# Patient Record
Sex: MALE | Race: WHITE | NOT HISPANIC OR LATINO | Employment: OTHER | URBAN - METROPOLITAN AREA
[De-identification: names, ages, dates, MRNs, and addresses within clinical notes are randomized per-mention and may not be internally consistent; named-entity substitution may affect disease eponyms.]

---

## 2017-10-13 ENCOUNTER — OFFICE VISIT (OUTPATIENT)
Dept: FAMILY MEDICINE | Facility: CLINIC | Age: 52
End: 2017-10-13
Payer: COMMERCIAL

## 2017-10-13 VITALS
HEART RATE: 74 BPM | HEIGHT: 66 IN | RESPIRATION RATE: 16 BRPM | BODY MASS INDEX: 30.22 KG/M2 | TEMPERATURE: 97.8 F | OXYGEN SATURATION: 97 % | DIASTOLIC BLOOD PRESSURE: 72 MMHG | WEIGHT: 188 LBS | SYSTOLIC BLOOD PRESSURE: 128 MMHG

## 2017-10-13 DIAGNOSIS — J20.9 ACUTE BRONCHITIS WITH SYMPTOMS > 10 DAYS: Primary | ICD-10-CM

## 2017-10-13 PROCEDURE — 99213 OFFICE O/P EST LOW 20 MIN: CPT | Performed by: FAMILY MEDICINE

## 2017-10-13 RX ORDER — ALBUTEROL SULFATE 90 UG/1
2 AEROSOL, METERED RESPIRATORY (INHALATION) EVERY 6 HOURS PRN
Qty: 1 INHALER | Refills: 0 | Status: SHIPPED | OUTPATIENT
Start: 2017-10-13 | End: 2018-05-11

## 2017-10-13 RX ORDER — AZITHROMYCIN 250 MG/1
TABLET, FILM COATED ORAL
Qty: 6 TABLET | Refills: 0 | Status: SHIPPED | OUTPATIENT
Start: 2017-10-13 | End: 2018-05-11

## 2017-10-13 NOTE — MR AVS SNAPSHOT
"              After Visit Summary   10/13/2017    Kevin Dover    MRN: [id number]           Patient Information     Date Of Birth          1965        Visit Information        Provider Department      10/13/2017 4:00 PM Bryn Levin MD [facility]3] [place]        Today's Diagnoses     Acute bronchitis with symptoms > 10 days    -  1       Follow-ups after your visit        Who to contact     If you have questions or need follow up information about today's clinic visit or your schedule please contact [facility] [place] directly at [phone].  Normal or non-critical lab and imaging results will be communicated to you by Xetalhart, letter or phone within 4 business days after the clinic has received the results. If you do not hear from us within 7 days, please contact the clinic through Xetalhart or phone. If you have a critical or abnormal lab result, we will notify you by phone as soon as possible.  Submit refill requests through Sr.Pago or call your pharmacy and they will forward the refill request to us. Please allow 3 business days for your refill to be completed.          Additional Information About Your Visit        MyChart Information     Sr.Pago lets you send messages to your doctor, view your test results, renew your prescriptions, schedule appointments and more. To sign up, go to [email or web address] . Click on \"Log in\" on the left side of the screen, which will take you to the Welcome page. Then click on \"Sign up Now\" on the right side of the page.     You will be asked to enter the access code listed below, as well as some personal information. Please follow the directions to create your username and password.     Your access code is: [email or web address]  Expires: 2018  4:34 PM     Your access code will  in 90 days. If you need help or a new code, please call your [facility] or [phone].        Care EveryWhere ID     This is your Care EveryWhere ID. This could " "be used by other organizations to access your [place] medical records  [id number]        Your Vitals Were     Pulse Temperature Respirations Height Pulse Oximetry BMI (Body Mass Index)    74 97.8  F (36.6  C) 16 5' 6\" (1.676 m) 97% 30.34 kg/m2       Blood Pressure from Last 3 Encounters:   10/13/17 128/72   11/08/16 108/66   03/09/16 136/80    Weight from Last 3 Encounters:   10/13/17 188 lb (85.3 kg)   11/08/16 197 lb (89.4 kg)   03/09/16 193 lb (87.5 kg)              Today, you had the following     No orders found for display         Today's Medication Changes          These changes are accurate as of: 10/13/17  4:34 PM.  If you have any questions, ask your nurse or doctor.               Start taking these medicines.        Dose/Directions    albuterol 108 (90 BASE) MCG/ACT Inhaler   Commonly known as:  PROAIR HFA/PROVENTIL HFA/VENTOLIN HFA   Used for:  Acute bronchitis with symptoms > 10 days   Started by:  Bryn Levin MD        Dose:  2 puff   Inhale 2 puffs into the lungs every 6 hours as needed for shortness of breath / dyspnea or wheezing   Quantity:  1 Inhaler   Refills:  0       azithromycin 250 MG tablet   Commonly known as:  ZITHROMAX   Used for:  Acute bronchitis with symptoms > 10 days   Started by:  Bryn Levin MD        Two tablets first day, then one tablet daily for four days.   Quantity:  6 tablet   Refills:  0            Where to get your medicines      [id number] [place] - [address] AT [facility]  [address] [address]     Phone:  [phone]     albuterol 108 (90 BASE) MCG/ACT Inhaler    azithromycin 250 MG tablet                Primary Care Provider Office Phone # Fax #    Bryn Levin [phone]       [address]  [place] [address]        Equal Access to Services     [facility]: Shaun Lamb, waaxda lustuart jean-baptiste waxay idiin " ailyn handjj lafaviojose ah. So [facility] [phone].    ATENCIÓN: Si fredrickla tootie, tiene a jones disposición servicios gratuitos de asistencia lingüística. Lani chatman [phone].    We comply with applicable federal civil rights laws and [place] laws. We do not discriminate on the basis of race, color, national origin, age, disability, sex, sexual orientation, or gender identity.            Thank you!     Thank you for choosing [facility] [place]  for your care. Our goal is always to provide you with excellent care. Hearing back from our patients is one way we can continue to improve our services. Please take a few minutes to complete the written survey that you may receive in the mail after your visit with us. Thank you!             Your Updated Medication List - Protect others around you: Learn how to safely use, store and throw away your medicines at www.disposemymeds.org.          This list is accurate as of: 10/13/17  4:34 PM.  Always use your most recent med list.                   Brand Name Dispense Instructions for use Diagnosis    albuterol 108 (90 BASE) MCG/ACT Inhaler    PROAIR HFA/PROVENTIL HFA/VENTOLIN HFA    1 Inhaler    Inhale 2 puffs into the lungs every 6 hours as needed for shortness of breath / dyspnea or wheezing    Acute bronchitis with symptoms > 10 days       azithromycin 250 MG tablet    ZITHROMAX    6 tablet    Two tablets first day, then one tablet daily for four days.    Acute bronchitis with symptoms > 10 days       ibuprofen 800 MG tablet    ADVIL/MOTRIN    30 tablet    TAKE 1 TABLET(800 MG) BY MOUTH EVERY 8 HOURS AS NEEDED FOR MODERATE PAIN    Episodic tension-type headache, not intractable

## 2017-10-13 NOTE — PROGRESS NOTES
"Chief Complaint   Patient presents with     URI       Initial /72  Pulse 74  Temp 97.8  F (36.6  C)  Resp 16  Ht 5' 6\" (1.676 m)  Wt 188 lb (85.3 kg)  SpO2 97%  BMI 30.34 kg/m2 Estimated body mass index is 30.34 kg/(m^2) as calculated from the following:    Height as of this encounter: 5' 6\" (1.676 m).    Weight as of this encounter: 188 lb (85.3 kg).  Medication Reconciliation: complete. KASSI Resendiz LPN        SUBJECTIVE:   Kevin Dover is a 52 year old male who presents to clinic today for the following health issues:      Acute Illness   Acute illness concerns: URI   Onset: 1 week    Fever: YES- evenings    Chills/Sweats: YES- both    Headache (location?): YES    Sinus Pressure:YES    Conjunctivitis:  YES- watery    Ear Pain: no    Rhinorrhea: YES- blowing a lot out - thick/yellow    Congestion: YES    Sore Throat: YES     Cough: YES-productive of yellow sputum    Wheeze: no    Decreased Appetite: no    Nausea: no    Vomiting: no    Diarrhea:  no    Dysuria/Freq.: no    Fatigue/Achiness: YES- both    Sick/Strep Exposure: YES- wife      Therapies Tried and outcome: nothing           Problem list and histories reviewed & adjusted, as indicated.  Additional history: as documented        Reviewed and updated as needed this visit by clinical staffTobacco  Allergies  Meds  Surg Hx  Fam Hx  Soc Hx      Reviewed and updated as needed this visit by Provider         ROS:  C: NEGATIVE for fever, chills, change in weight  ENT/MOUTH: POSITIVE for sinus congestion  R: Nonproductive cough  CV: NEGATIVE for chest pain, palpitations or peripheral edema    OBJECTIVE:                                                    /72  Pulse 74  Temp 97.8  F (36.6  C)  Resp 16  Ht 5' 6\" (1.676 m)  Wt 188 lb (85.3 kg)  SpO2 97%  BMI 30.34 kg/m2  Body mass index is 30.34 kg/(m^2).   GENERAL: healthy, alert, well nourished, well hydrated, no distress  HENT: ear canals- normal; TMs- normal; Nose- normal; Mouth- no " ulcers, no lesions  NECK: no tenderness, no adenopathy, no asymmetry, no masses, no stiffness; thyroid- normal to palpation  RESP: lungs clear to auscultation -mild wheezing noted, no crackles  CV: regular rates and rhythm, normal S1 S2, no S3 or S4 and no murmur, no click or rub -       ASSESSMENT/PLAN:                                                        ICD-10-CM    1. Acute bronchitis with symptoms > 10 days J20.9 azithromycin (ZITHROMAX) 250 MG tablet     albuterol (PROAIR HFA/PROVENTIL HFA/VENTOLIN HFA) 108 (90 BASE) MCG/ACT Inhaler       Patient's symptoms are most likely upper respiratory bronchitis. I will suggest patient to use azithromycin along with albuterol inhaler. Follow up if symptoms are not changing or getting worse.    Bryn Levin MD  [facility]

## 2018-05-02 NOTE — TELEPHONE ENCOUNTER
FUTURE VISIT INFORMATION      FUTURE VISIT INFORMATION:    Date: 5-11-18    Time:     Location:   REFERRAL INFORMATION:    Referring provider:  self    Referring providers clinic:      Reason for visit/diagnosis  tinnitus    RECORDS REQUESTED FROM:       Clinic name Comments Records Status Imaging Status                                         RECORDS STATUS

## 2018-05-04 ENCOUNTER — TELEPHONE (OUTPATIENT)
Dept: OTOLARYNGOLOGY | Facility: CLINIC | Age: 53
End: 2018-05-04

## 2018-05-04 NOTE — TELEPHONE ENCOUNTER
Left patient voicemail regarding where patient has records for his appointment with Dr Jorge. Gave patient my direct phone number to contact.      Emelyn  [facility] Coordinator

## 2018-05-10 DIAGNOSIS — H93.19 TINNITUS: Primary | ICD-10-CM

## 2018-05-11 ENCOUNTER — OFFICE VISIT (OUTPATIENT)
Dept: AUDIOLOGY | Facility: CLINIC | Age: 53
End: 2018-05-11
Payer: COMMERCIAL

## 2018-05-11 ENCOUNTER — PRE VISIT (OUTPATIENT)
Dept: OTOLARYNGOLOGY | Facility: CLINIC | Age: 53
End: 2018-05-11

## 2018-05-11 ENCOUNTER — OFFICE VISIT (OUTPATIENT)
Dept: OTOLARYNGOLOGY | Facility: CLINIC | Age: 53
End: 2018-05-11
Payer: COMMERCIAL

## 2018-05-11 VITALS — BODY MASS INDEX: 29.25 KG/M2 | HEIGHT: 68 IN | WEIGHT: 193 LBS

## 2018-05-11 DIAGNOSIS — H93.19 TINNITUS: ICD-10-CM

## 2018-05-11 DIAGNOSIS — D16.4 OSTEOMA OF EXTERNAL AUDITORY CANAL: ICD-10-CM

## 2018-05-11 DIAGNOSIS — H93.13 TINNITUS, BILATERAL: Primary | ICD-10-CM

## 2018-05-11 DIAGNOSIS — H93.13 TINNITUS OF BOTH EARS: ICD-10-CM

## 2018-05-11 DIAGNOSIS — H90.3 SENSORINEURAL HEARING LOSS, BILATERAL: Primary | ICD-10-CM

## 2018-05-11 PROBLEM — M71.21 BAKER'S CYST OF KNEE, RIGHT: Status: ACTIVE | Noted: 2018-01-14

## 2018-05-11 PROBLEM — M17.11 ARTHRITIS OF RIGHT KNEE: Status: ACTIVE | Noted: 2018-01-14

## 2018-05-11 ASSESSMENT — PAIN SCALES - GENERAL: PAINLEVEL: NO PAIN (0)

## 2018-05-11 NOTE — MR AVS SNAPSHOT
After Visit Summary   2018    Kevin Dover    MRN: [id number]           Patient Information     Date Of Birth          1965        Visit Information        Provider Department      2018 8:30 AM Chidi Jorge MD M [facility] Ear Nose and Throat        Care Instructions    You will have a consult with Dr. Joyce Ortiz to discuss surgery for right ear osteoma.   Please call our clinic for any questions,concerns,or worsening symptoms.      Clinic #[phone]       Option 3  for the triage nurse.          Follow-ups after your visit        Your next 10 appointments already scheduled     2018  3:00 PM CDT   (Arrive by 2:45 PM)   NEW NEUROTOLOGY VISIT with MD ALTAGRACIA Chavez [facility] Ear Nose and Throat ([facility])    [address] [place] [address] [phone]              Who to contact     Please call your clinic at [phone] to:    Ask questions about your health    Make or cancel appointments    Discuss your medicines    Learn about your test results    Speak to your doctor            Additional Information About Your Visit        MyChart Information     Skyrider is an electronic gateway that provides easy, online access to your medical records. With Skyrider, you can request a clinic appointment, read your test results, renew a prescription or communicate with your care team.     To sign up for Skyrider visit the website at [email or web address]   You will be asked to enter the access code listed below, as well as some personal information. Please follow the directions to create your username and password.     Your access code is: [email or web address]  Expires: 2018  6:30 AM     Your access code will  in 90 days. If you need help or a new code, please contact your [facility] or call [phone] for assistance.        Care EveryWhere ID     This is your Care EveryWhere  "ID. This could be used by other organizations to access your [place] medical records  [id number]        Your Vitals Were     Height BMI (Body Mass Index)                1.72 m (5' 7.72\") 29.59 kg/m2           Blood Pressure from Last 3 Encounters:   10/13/17 128/72   11/08/16 108/66   03/09/16 136/80    Weight from Last 3 Encounters:   05/11/18 87.5 kg (193 lb)   10/13/17 85.3 kg (188 lb)   11/08/16 89.4 kg (197 lb)              Today, you had the following     No orders found for display       Primary Care Provider Office Phone # Fax #    Bryn Levin [phone]       [address]  [place] [address]        Equal Access to Services     [facility]: Hadii hemal hungo [place], waaxda luqadaha, qaybta kaalmada mara, loren jacobo . So [facility] [phone].    ATENCIÓN: Si habla español, tiene a jones disposición servicios gratuitos de asistencia lingüística. Lani al [phone].    We comply with applicable federal civil rights laws and [place] laws. We do not discriminate on the basis of race, color, national origin, age, disability, sex, sexual orientation, or gender identity.            Thank you!     Thank you for choosing [facility] EAR NOSE AND THROAT  for your care. Our goal is always to provide you with excellent care. Hearing back from our patients is one way we can continue to improve our services. Please take a few minutes to complete the written survey that you may receive in the mail after your visit with us. Thank you!             Your Updated Medication List - Protect others around you: Learn how to safely use, store and throw away your medicines at www.disposemymeds.org.          This list is accurate as of 5/11/18  8:40 AM.  Always use your most recent med list.                   Brand Name Dispense Instructions for use Diagnosis    ACETAMINOPHEN PO           ibuprofen 800 MG tablet    ADVIL/MOTRIN    30 tablet    TAKE 1 TABLET(800 MG) " BY MOUTH EVERY 8 HOURS AS NEEDED FOR MODERATE PAIN    Episodic tension-type headache, not intractable

## 2018-05-11 NOTE — LETTER
5/11/2018       RE: Kevin Dover  [address] [place] [address]     Dear Colleague,    Thank you for referring your patient, Kevin Dover, to the [facility] EAR NOSE AND TH[facility]AT at [facility]. Please see a copy of my visit note below.    Kevin Dover is seen because I cared for his wife..  He is a 52 year old male being seen for tinnitus. The patient notes a gradual onset of progressive tinnitus which is slightly louder and his right ear than his left ear. He reports that this has been bothersome and irritating. He denies any drainage or pain. He has no other history.    Past Medical History:   Diagnosis Date     Conductive hearing loss      Hearing problem      NO ACTIVE PROBLEMS      Tinnitus        Past Surgical History:   Procedure Laterality Date     C NONSPECIFIC PROCEDURE      varicocoel     HERNIORRHAPHY VENTRAL N/A 3/2/2015    Procedure: HERNIORRHAPHY VENTRAL;  Surgeon: Flavio Nowak MD;  Location: [facility] OR     RHINOPLASTY         Family History   Problem Relation Age of Onset     CANCER Father      lung CA     Cancer - colorectal Father      C.A.D. 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 He denies any exposure to weapons.    Patient Supplied Answers to Review of Systems   ENT ROS 5/11/2018   Ears, Nose, Throat Hearing loss, Ringing/noise in ears     The remainder of the 10 point review of systems is otherwise negative.    Physical examination:  Constitutional:  In no acute distress, appears stated age  Eyes:  Extraocular movements intact, no spontaneous  nystagmus  Ears:  Both ears examined under the microscope.  Today his left ear looked normal. The right ear had a cerumen impaction. In removing all of the cerumen posterior and superior there is a relatively small osteoma. It is at the center of the cerumen impaction.  Respiratory:  No increased work of breathing, wheezing or stridor  Musculoskeletal:  Good upper extremity strength  Skin:  No rashes on the head and neck  Neurologic:  House Brackman 1/6 bilaterally, ambulating normally  Psychiatric:  Alert, normal affect, answering questions appropriately    Audiogram:  His pure-tone average is normal at 25 bilaterally and there is a slight asymmetry between the 2 ears. The right ear is slightly poorer    Assessment and plan:  Tinnitus etiology unclear. Osteoma, right    After I removed all of the cerumen the patient said his tinnitus was substantially improved. We talked about the indications to remove an osteoma. Usually, one would see frequent cerumen impactions or otorrhea before removing an osteoma. He feels that the removal of the wax gave him substantial improvement in his symptoms and he is interested in removal of the osteoma. I told him that this is a relatively low risk procedure but is unlikely to cure his symptoms of tinnitus. He understands that this may reduce the frequency of cerumen impactions and thus reduce the frequency of the impaction related problem of tinnitus; Since the risk is low, he is interested in proceeding. I will refer him to one of my colleagues.      Again, thank you for allowing me to participate in the care of your patient.      Sincerely,    Chidi Jorge MD

## 2018-05-11 NOTE — PATIENT INSTRUCTIONS
You will have a consult with Dr. Joyce Ortiz to discuss surgery for right ear osteoma.   Please call our clinic for any questions,concerns,or worsening symptoms.      Clinic #[phone]       Option 3  for the triage nurse.

## 2018-05-11 NOTE — PROGRESS NOTES
AUDIOLOGY REPORT    SUMMARY: Audiology visit completed. See audiogram for results.      RECOMMENDATIONS: Follow-up with ENT.    Onel Jerez, [facility]  Licensed Audiologist  [place] License #[id number]

## 2018-05-11 NOTE — MR AVS SNAPSHOT
After Visit Summary   2018    Kevin Dover    MRN: [id number]           Patient Information     Date Of Birth          1965        Visit Information        Provider Department      2018 7:30 AM Mara Luque, [facility] Audiology        Today's Diagnoses     Sensorineural hearing loss, bilateral    -  1    Tinnitus        Tinnitus of both ears           Follow-ups after your visit        Your next 10 appointments already scheduled     May 11, 2018  8:30 AM CDT   (Arrive by 8:15 AM)   New Patient Visit with MD ALTAGRACIA Mccarthy [facility] Ear Nose and Throat ([facility] [place])    [address] [place] [address] [phone]              Who to contact     Please call your clinic at [phone] to:    Ask questions about your health    Make or cancel appointments    Discuss your medicines    Learn about your test results    Speak to your doctor            Additional Information About Your Visit        MyChart Information     Handupt is an electronic gateway that provides easy, online access to your medical records. With GlucoVista, you can request a clinic appointment, read your test results, renew a prescription or communicate with your care team.     To sign up for Handupt visit the website at [email or web address]   You will be asked to enter the access code listed below, as well as some personal information. Please follow the directions to create your username and password.     Your access code is: [email or web address]  Expires: 2018  6:30 AM     Your access code will  in 90 days. If you need help or a new code, please contact your [facility] or call [phone] for assistance.        Care EveryWhere ID     This is your Care EveryWhere ID. This could be used by other organizations to access your [place] medical records  [id number]         Blood Pressure from Last 3 Encounters:   10/13/17  128/72   11/08/16 108/66   03/09/16 136/80    Weight from Last 3 Encounters:   10/13/17 85.3 kg (188 lb)   11/08/16 89.4 kg (197 lb)   03/09/16 87.5 kg (193 lb)              We Performed the Following     AUDIOLOGY ADULT REFERRAL     [facility] Eval & Speech Recog ([id number])     Tymps / Reflex   ([id number])        Primary Care Provider Office Phone # Fax #    Bryn Levin [phone]       [address] [place] [facility] [place] [address]        Equal Access to Services     [facility]: Hadii aad ku hadasho Sodamonali, waaxda luqadaha, qaybta kaalmada adeegyada, waxgianni milliganin иванn karen jcaobo . So [facility] [phone].    ATENCIÓN: Si habla español, tiene a jones disposición servicios gratuitos de asistencia lingüística. LlSumma Health [facility] [phone].    We comply with applicable federal civil rights laws and [place] laws. We do not discriminate on the basis of race, color, national origin, age, disability, sex, sexual orientation, or gender identity.            Thank you!     Thank you for choosing [facility] AUDIOLOGY  for your care. Our goal is always to provide you with excellent care. Hearing back from our patients is one way we can continue to improve our services. Please take a few minutes to complete the written survey that you may receive in the mail after your visit with us. Thank you!             Your Updated Medication List - Protect others around you: Learn how to safely use, store and throw away your medicines at www.disposemymeds.org.          This list is accurate as of 5/11/18  7:45 AM.  Always use your most recent med list.                   Brand Name Dispense Instructions for use Diagnosis    albuterol 108 (90 Base) MCG/ACT Inhaler    PROAIR HFA/PROVENTIL HFA/VENTOLIN HFA    1 Inhaler    Inhale 2 puffs into the lungs every 6 hours as needed for shortness of breath / dyspnea or wheezing    Acute bronchitis with symptoms > 10 days       azithromycin 250 MG tablet    ZITHROMAX    6  tablet    Two tablets first day, then one tablet daily for four days.    Acute bronchitis with symptoms > 10 days       ibuprofen 800 MG tablet    ADVIL/MOTRIN    30 tablet    TAKE 1 TABLET(800 MG) BY MOUTH EVERY 8 HOURS AS NEEDED FOR MODERATE PAIN    Episodic tension-type headache, not intractable

## 2018-05-11 NOTE — PROGRESS NOTES
Kevin Dover is seen because I cared for his wife..  He is a 52 year old male being seen for tinnitus. The patient notes a gradual onset of progressive tinnitus which is slightly louder and his right ear than his left ear. He reports that this has been bothersome and irritating. He denies any drainage or pain. He has no other history.    Past Medical History:   Diagnosis Date     Conductive hearing loss      Hearing problem      NO ACTIVE PROBLEMS      Tinnitus        Past Surgical History:   Procedure Laterality Date     C NONSPECIFIC PROCEDURE      varicocoel     HERNIORRHAPHY VENTRAL N/A 3/2/2015    Procedure: HERNIORRHAPHY VENTRAL;  Surgeon: Flavio Nowak MD;  Location: [facility] OR     RHINOPLASTY         Family History   Problem Relation Age of Onset     CANCER Father      lung CA     Cancer - colorectal Father      C.A.D. Mother      CEREBROVASCULAR DISEASE Mother      Breast Cancer Sister      DIABETES No family hx of      Hypertension No family hx of        Social History   Substance Use Topics     Smoking status: Former Smoker     Packs/day: 1.00     Years: 20.00     Types: Cigarettes     Quit date: 9/1/2004     Smokeless tobacco: Never Used     Alcohol use No   \  He works in the [employer] industry and has been exposed to size most of his life. He denies any exposure to weapons.    Patient Supplied Answers to Review of Systems   ENT ROS 5/11/2018   Ears, Nose, Throat Hearing loss, Ringing/noise in ears     The remainder of the 10 point review of systems is otherwise negative.    Physical examination:  Constitutional:  In no acute distress, appears stated age  Eyes:  Extraocular movements intact, no spontaneous nystagmus  Ears:  Both ears examined under the microscope.  Today his left ear looked normal. The right ear had a cerumen impaction. In removing all of the cerumen posterior and superior there is a relatively small osteoma. It is at the center of the cerumen impaction.  Respiratory:  No increased  work of breathing, wheezing or stridor  Musculoskeletal:  Good upper extremity strength  Skin:  No rashes on the head and neck  Neurologic:  House Brackman 1/6 bilaterally, ambulating normally  Psychiatric:  Alert, normal affect, answering questions appropriately    Audiogram:  His pure-tone average is normal at 25 bilaterally and there is a slight asymmetry between the 2 ears. The right ear is slightly poorer    Assessment and plan:  Tinnitus etiology unclear. Osteoma, right    After I removed all of the cerumen the patient said his tinnitus was substantially improved. We talked about the indications to remove an osteoma. Usually, one would see frequent cerumen impactions or otorrhea before removing an osteoma. He feels that the removal of the wax gave him substantial improvement in his symptoms and he is interested in removal of the osteoma. I told him that this is a relatively low risk procedure but is unlikely to cure his symptoms of tinnitus. He understands that this may reduce the frequency of cerumen impactions and thus reduce the frequency of the impaction related problem of tinnitus; Since the risk is low, he is interested in proceeding. I will refer him to one of my colleagues.

## 2018-05-14 PROBLEM — D16.4 OSTEOMA OF EXTERNAL AUDITORY CANAL: Status: ACTIVE | Noted: 2018-05-14

## 2018-05-14 PROBLEM — H93.13 TINNITUS, BILATERAL: Status: ACTIVE | Noted: 2018-05-14

## 2018-05-30 NOTE — TELEPHONE ENCOUNTER
FUTURE VISIT INFORMATION      FUTURE VISIT INFORMATION:    Date: 6-5-18    Time:     Location:   REFERRAL INFORMATION:    Referring provider:  Dr Jorge    Referring providers clinic:      Reason for visit/diagnosis  surgery consult, ear ostemoa    RECORDS REQUESTED FROM:       Clinic name Comments Records Status Imaging Status   All records internal                                       RECORDS STATUS

## 2018-06-05 ENCOUNTER — OFFICE VISIT (OUTPATIENT)
Dept: OTOLARYNGOLOGY | Facility: CLINIC | Age: 53
End: 2018-06-05
Payer: COMMERCIAL

## 2018-06-05 ENCOUNTER — PRE VISIT (OUTPATIENT)
Dept: OTOLARYNGOLOGY | Facility: CLINIC | Age: 53
End: 2018-06-05

## 2018-06-05 VITALS
DIASTOLIC BLOOD PRESSURE: 75 MMHG | BODY MASS INDEX: 30.13 KG/M2 | HEIGHT: 67 IN | WEIGHT: 192 LBS | SYSTOLIC BLOOD PRESSURE: 136 MMHG

## 2018-06-05 DIAGNOSIS — D16.4 OSTEOMA OF EXTERNAL AUDITORY CANAL: Primary | ICD-10-CM

## 2018-06-05 DIAGNOSIS — H93.13 TINNITUS, BILATERAL: ICD-10-CM

## 2018-06-05 ASSESSMENT — PAIN SCALES - GENERAL: PAINLEVEL: NO PAIN (0)

## 2018-06-05 NOTE — MR AVS SNAPSHOT
"              After Visit Summary   2018    Kevin Dover    MRN: [id number]           Patient Information     Date Of Birth          1965        Visit Information        Provider Department      2018 3:30 PM Joyce Ortiz MD [facility] Ear Nose and Throat        Today's Diagnoses     Osteoma of external auditory canal    -  1    Tinnitus, bilateral          Care Instructions    1. Please follow-up in clinic in 1 year with an audiogram.   2. Please call the ENT clinic with any questions,concerns, new or worsening symptoms.    -Clinic number is [phone]   - [facility] direct line (Dr. Ortiz' nurse) [phone]              Follow-ups after your visit        Who to contact     Please call your clinic at [phone] to:    Ask questions about your health    Make or cancel appointments    Discuss your medicines    Learn about your test results    Speak to your doctor            Additional Information About Your Visit        MyChart Information     [employer] is an electronic gateway that provides easy, online access to your medical records. With [employer], you can request a clinic appointment, read your test results, renew a prescription or communicate with your care team.     To sign up for [employer] visit the website at [email or web address]   You will be asked to enter the access code listed below, as well as some personal information. Please follow the directions to create your username and password.     Your access code is: [email or web address]  Expires: 2018  6:30 AM     Your access code will  in 90 days. If you need help or a new code, please contact your [facility] or call [phone] for assistance.        Care EveryWhere ID     This is your Care EveryWhere ID. This could be used by other organizations to access your [facility] medical records  [id number]        Your Vitals Were     Height BMI (Body Mass Index)                1.702 m (5' 7\") 30.07 " kg/m2           Blood Pressure from Last 3 Encounters:   06/05/18 136/75   10/13/17 128/72   11/08/16 108/66    Weight from Last 3 Encounters:   06/05/18 87.1 kg (192 lb)   05/11/18 87.5 kg (193 lb)   10/13/17 85.3 kg (188 lb)              Today, you had the following     No orders found for display       Primary Care Provider Office Phone # Fax #    Bryn Levin [phone]       [address]  [place] [address]        Equal Access to Services     [facility]: Hadii hemal peñaloza hadkathrin Soambika, wasvetlanada yesi, qaybta kaalmada mara, loren jacobo . So [facility] [phone].    ATENCIÓN: Si habla español, tiene a jones disposición servicios gratuitos de asistencia lingüística. Llame al [phone].    We comply with applicable federal civil rights laws and [place] laws. We do not discriminate on the basis of race, color, national origin, age, disability, sex, sexual orientation, or gender identity.            Thank you!     Thank you for choosing [facility] TH[facility]  for your care. Our goal is always to provide you with excellent care. Hearing back from our patients is one way we can continue to improve our services. Please take a few minutes to complete the written survey that you may receive in the mail after your visit with us. Thank you!             Your Updated Medication List - Protect others around you: Learn how to safely use, store and throw away your medicines at www.disposemymeds.org.          This list is accurate as of 6/5/18 11:59 PM.  Always use your most recent med list.                   Brand Name Dispense Instructions for use Diagnosis    ACETAMINOPHEN PO           ibuprofen 800 MG tablet    ADVIL/MOTRIN    30 tablet    TAKE 1 TABLET(800 MG) BY MOUTH EVERY 8 HOURS AS NEEDED FOR MODERATE PAIN    Episodic tension-type headache, not intractable

## 2018-06-05 NOTE — PROGRESS NOTES
History of the present illness:  Mr. Dover is a 52 year old man with a history of bilateral tonal tinnitus onset three years ago presenting for evaluation of a right sided canal osteoma. The patient also notes bilateral hearing loss over this interval but is unsure if one ear is worse than the other. The patient was evaluated by Dr. Jorge on 5/11/18 who noted right side cerumen impaction. When removing the cerumen he described an osteoma in the right superior posterior segment. The patient states that his tinnitus was briefly improved after the cerumen removal but returned later that day. 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 He denies otalgia and otorrhea bilaterally.     Past Medical History:   Diagnosis Date     Conductive hearing loss      Hearing problem      NO ACTIVE PROBLEMS      Tinnitus      Past Surgical History:   Procedure Laterality Date     C NONSPECIFIC PROCEDURE      varicocoel     HERNIORRHAPHY VENTRAL N/A 3/2/2015    Procedure: HERNIORRHAPHY VENTRAL;  Surgeon: Flavio Nowak MD;  Location: [facility] OR     RHINOPLASTY       Current Outpatient Prescriptions   Medication     ACETAMINOPHEN PO     ibuprofen (ADVIL/MOTRIN) 800 MG tablet     No current facility-administered medications for this visit.      Social History     Social History     Marital status:      Spouse name: N/A     Number of children: N/A     Years of education: N/A     Occupational History     Not on file.     Social History Main Topics     Smoking status: Former Smoker     Packs/day: 1.00     Years: 20.00     Types: Cigarettes     Quit date: 9/1/2004     Smokeless tobacco: Never Used     Alcohol use No     Drug use: No     Sexual activity: Yes     Partners: Female     Other Topics Concern     Parent/Sibling W/ Cabg, Mi Or Angioplasty Before 65f 55m? No     Social History Narrative     Allergies: No Known Allergies    Patient  "Supplied Answers to Review of Systems   ENT ROS 6/5/2018   Ears, Nose, Throat Ringing/noise in ears   A full 10 point review of systems was conducted and all other systems negative unless mentioned above or in HPI.     Physical Exam:  /75  Ht 1.702 m (5' 7\")  Wt 87.1 kg (192 lb)  BMI 30.07 kg/m2  Constitutional: awake, alert, no acute distress  HEENT:   Right ear microscopic exam: smooth round mass at the bony-cartilaginous junction superior posterior segment proximal to the TM (about 3 mm) with mild surrounding cerumen that was easily removed, TM pearly white, no canal erythema, no significant cerumen impaction  Left ear microscopic exam: TM pearly white, no canal erythema, no significant cerumen impaction  Neuro: PERRL, extraocular movements intact  Resp: speaking in full sentences, no accessory muscle use  Skin: warm, pink  Psych: calm, answered questions appropriately.    Audiology exam 5/11/18: 100% word recognition at 65 dBHL bilaterally. Normal to mild SNHL bilaterally with air bone gaps present    Assessment and Plan  Mr. Dover is a 52 year old man with a history of bilateral tonal tinnitus onset three years ago presenting for evaluation of a right sided canal osteoma. This was visualized on exam but was not in contact with the TM, thus not likely contributing to his tinnitus. He stated that he only wanted a procedure to remove the osteoma if it will remove his tinnitus. It will not.  Therefore, we discussed different approaches to tinnitus management, including use of hearing aids to improve tinnitus and provided referral information. Also discussed use of masking noise to help the patient sleep at night. Plan for repeat audiogram in one year and subsequent follow up appointment.    Leila Hsu, MS4    The documentation recorded by the scribe accurately reflects the services I personally performed and the decisions made by me.    "

## 2018-06-05 NOTE — PATIENT INSTRUCTIONS
1. Please follow-up in clinic in 1 year with an audiogram.   2. Please call the ENT clinic with any questions,concerns, new or worsening symptoms.    -Clinic number is [phone]   - [facility] direct line (Dr. Ortiz' nurse) [phone]

## 2018-06-05 NOTE — LETTER
Hearing Aid Medical Clearance    Kevin Dover  June 5, 2018        This patient has received a medical examination and may be considered a suitable candidate for a hearing aid.         Physician:__________________________________________________

## 2018-06-05 NOTE — LETTER
6/5/2018       RE: Kevin Dover  [address] [place] [address]     Dear Colleague,    Thank you for referring your patient, Kevin Dover, to the [facility] at [facility]. Please see a copy of my visit note below.      History of the present illness:  Mr. Dover is a 52 year old man with a history of bilateral tonal tinnitus onset three years ago presenting for evaluation of a right sided canal osteoma. The patient also notes bilateral hearing loss over this interval but is unsure if one ear is worse than the other. The patient was evaluated by Dr. Jorge on 5/11/18 who noted right side cerumen impaction. When removing the cerumen he described an osteoma in the right superior posterior segment. The patient states that his tinnitus was briefly improved after the cerumen removal but returned later that day. 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 He denies otalgia and otorrhea bilaterally.     Past Medical History:   Diagnosis Date     Conductive hearing loss      Hearing problem      NO ACTIVE PROBLEMS      Tinnitus      Past Surgical History:   Procedure Laterality Date     C NONSPECIFIC PROCEDURE      varicocoel     HERNIORRHAPHY VENTRAL N/A 3/2/2015    Procedure: HERNIORRHAPHY VENTRAL;  Surgeon: Flavio Nowak MD;  Location: [facility] OR     RHINOPLASTY       Current Outpatient Prescriptions   Medication     ACETAMINOPHEN PO     ibuprofen (ADVIL/MOTRIN) 800 MG tablet     No current facility-administered medications for this visit.      Social History     Social History     Marital status:      Spouse name: N/A     Number of children: N/A     Years of education: N/A     Occupational History     Not on file.     Social History Main Topics     Smoking status: Former Smoker     Packs/day: 1.00     Years: 20.00     Types: Cigarettes     Quit date:  "9/1/2004     Smokeless tobacco: Never Used     Alcohol use No     Drug use: No     Sexual activity: Yes     Partners: Female     Other Topics Concern     Parent/Sibling W/ Cabg, Mi Or Angioplasty Before 65f 55m? No     Social History Narrative     Allergies: No Known Allergies    Patient Supplied Answers to Review of Systems  [facility] ENT ROS 6/5/2018   Ears, Nose, Throat Ringing/noise in ears   A full 10 point review of systems was conducted and all other systems negative unless mentioned above or in HPI.     Physical Exam:  /75  Ht 1.702 m (5' 7\")  Wt 87.1 kg (192 lb)  BMI 30.07 kg/m2  Constitutional: awake, alert, no acute distress  HEENT:   Right ear microscopic exam: smooth round mass at the bony-cartilaginous junction superior posterior segment proximal to the TM (about 3 mm) with mild surrounding cerumen that was easily removed, TM pearly white, no canal erythema, no significant cerumen impaction  Left ear microscopic exam: TM pearly white, no canal erythema, no significant cerumen impaction  Neuro: PERRL, extraocular movements intact  Resp: speaking in full sentences, no accessory muscle use  Skin: warm, pink  Psych: calm, answered questions appropriately.    Audiology exam 5/11/18: 100% word recognition at 65 dBHL bilaterally. Normal to mild SNHL bilaterally with air bone gaps present    Assessment and Plan  Mr. Dover is a 52 year old man with a history of bilateral tonal tinnitus onset three years ago presenting for evaluation of a right sided canal osteoma. This was visualized on exam but was not in contact with the TM, thus not likely contributing to his tinnitus. He stated that he only wanted a procedure to remove the osteoma if it will remove his tinnitus. It will not.  Therefore, we discussed different approaches to tinnitus management, including use of hearing aids to improve tinnitus and provided referral information. Also discussed use of masking noise to help the patient sleep at night. Plan " for repeat audiogram in one year and subsequent follow up appointment.    Leila Hsu, MS4    The documentation recorded by the scribe accurately reflects the services I personally performed and the decisions made by me.      Again, thank you for allowing me to participate in the care of your patient.      Sincerely,    Joyce Ortiz MD

## 2018-06-05 NOTE — NURSING NOTE
Chief Complaint   Patient presents with     RECHECK     surgery for right ear osteoma, per Dr. Luisito Cook, EMT

## 2018-09-11 ENCOUNTER — OFFICE VISIT (OUTPATIENT)
Dept: FAMILY MEDICINE | Facility: CLINIC | Age: 53
End: 2018-09-11
Payer: COMMERCIAL

## 2018-09-11 VITALS
TEMPERATURE: 98 F | BODY MASS INDEX: 29.13 KG/M2 | HEART RATE: 92 BPM | SYSTOLIC BLOOD PRESSURE: 124 MMHG | DIASTOLIC BLOOD PRESSURE: 70 MMHG | WEIGHT: 186 LBS

## 2018-09-11 DIAGNOSIS — S83.8X1S MENISCAL INJURY, RIGHT, SEQUELA: ICD-10-CM

## 2018-09-11 DIAGNOSIS — Z01.818 PREOP GENERAL PHYSICAL EXAM: Primary | ICD-10-CM

## 2018-09-11 LAB — HGB BLD-MCNC: 14.1 G/DL (ref 13.3–17.7)

## 2018-09-11 PROCEDURE — 85018 HEMOGLOBIN: CPT | Performed by: FAMILY MEDICINE

## 2018-09-11 PROCEDURE — 99214 OFFICE O/P EST MOD 30 MIN: CPT | Performed by: FAMILY MEDICINE

## 2018-09-11 PROCEDURE — 36415 COLL VENOUS BLD VENIPUNCTURE: CPT | Performed by: FAMILY MEDICINE

## 2018-09-11 NOTE — MR AVS SNAPSHOT
After Visit Summary   9/11/2018    Kevin Dover    MRN: [id number]           Patient Information     Date Of Birth          1965        Visit Information        Provider Department      9/11/2018 10:20 AM Bryn Levin MD [facility] [place]        Today's Diagnoses     Preop general physical exam    -  1    Meniscal injury, right, sequela          Care Instructions      Before Your Surgery      Call your surgeon if there is any change in your health. This includes signs of a cold or flu (such as a sore throat, runny nose, cough, rash or fever).    Do not smoke, drink alcohol or take over the counter medicine (unless your surgeon or primary care doctor tells you to) for the 24 hours before and after surgery.    If you take prescribed drugs: Follow your doctor s orders about which medicines to take and which to stop until after surgery.    Eating and drinking prior to surgery: follow the instructions from your surgeon    Take a shower or bath the night before surgery. Use the soap your surgeon gave you to gently clean your skin. If you do not have soap from your surgeon, use your regular soap. Do not shave or scrub the surgery site.  Wear clean pajamas and have clean sheets on your bed.           Follow-ups after your visit        Follow-up notes from your care team     Return in about 4 weeks (around 10/9/2018) for Physical Exam.      Who to contact     If you have questions or need follow up information about today's clinic visit or your schedule please contact [facility] [place] directly at [phone].  Normal or non-critical lab and imaging results will be communicated to you by MyChart, letter or phone within 4 business days after the clinic has received the results. If you do not hear from us within 7 days, please contact the clinic through MyChart or phone. If you have a critical or abnormal lab result, we will notify you by phone as soon as possible.  Submit refill  "requests through [employer] or call your pharmacy and they will forward the refill request to us. Please allow 3 business days for your refill to be completed.          Additional Information About Your Visit        [employer] Information     [employer] lets you send messages to your doctor, view your test results, renew your prescriptions, schedule appointments and more. To sign up, go to [email or web address] . Click on \"Log in\" on the left side of the screen, which will take you to the Welcome page. Then click on \"Sign up Now\" on the right side of the page.     You will be asked to enter the access code listed below, as well as some personal information. Please follow the directions to create your username and password.     Your access code is: [email or web address]  Expires: 12/10/2018 10:38 AM     Your access code will  in 90 days. If you need help or a new code, please call your [place] clinic or [phone].        Care EveryWhere ID     This is your Care EveryWhere ID. This could be used by other organizations to access your [place] medical records  [id number]        Your Vitals Were     Pulse Temperature BMI (Body Mass Index)             92 98  F (36.7  C) (Tympanic) 29.13 kg/m2          Blood Pressure from Last 3 Encounters:   18 124/70   18 136/75   10/13/17 128/72    Weight from Last 3 Encounters:   18 186 lb (84.4 kg)   18 192 lb (87.1 kg)   18 193 lb (87.5 kg)              We Performed the Following     Hemoglobin        Primary Care Provider Office Phone # Fax #    Bryn Levin [phone]       [address]  [place] [address]        Equal Access to Services     [facility]: Shaun Lamb, stanislaw key, qaloren braden. [facility] [phone].    ATENCIÓN: Si habla español, tiene a jones disposición servicios gratuitos de asistencia lingüística. Llame al [phone].    We comply " with applicable federal civil rights laws and [place] laws. We do not discriminate on the basis of race, color, national origin, age, disability, sex, sexual orientation, or gender identity.            Thank you!     Thank you for choosing [facility] [place]  for your care. Our goal is always to provide you with excellent care. Hearing back from our patients is one way we can continue to improve our services. Please take a few minutes to complete the written survey that you may receive in the mail after your visit with us. Thank you!             Your Updated Medication List - Protect others around you: Learn how to safely use, store and throw away your medicines at www.disposemymeds.org.          This list is accurate as of 9/11/18 10:38 AM.  Always use your most recent med list.                   Brand Name Dispense Instructions for use Diagnosis    ACETAMINOPHEN PO           ibuprofen 800 MG tablet    ADVIL/MOTRIN    30 tablet    TAKE 1 TABLET(800 MG) BY MOUTH EVERY 8 HOURS AS NEEDED FOR MODERATE PAIN    Episodic tension-type headache, not intractable

## 2020-02-12 ENCOUNTER — OFFICE VISIT (OUTPATIENT)
Dept: FAMILY MEDICINE | Facility: CLINIC | Age: 55
End: 2020-02-12
Payer: COMMERCIAL

## 2020-02-12 VITALS
HEART RATE: 78 BPM | SYSTOLIC BLOOD PRESSURE: 134 MMHG | WEIGHT: 200 LBS | DIASTOLIC BLOOD PRESSURE: 80 MMHG | BODY MASS INDEX: 31.39 KG/M2 | HEIGHT: 67 IN | OXYGEN SATURATION: 98 % | TEMPERATURE: 96.6 F

## 2020-02-12 DIAGNOSIS — Z13.6 CARDIOVASCULAR SCREENING; LDL GOAL LESS THAN 160: ICD-10-CM

## 2020-02-12 DIAGNOSIS — M17.11 ARTHRITIS OF RIGHT KNEE: ICD-10-CM

## 2020-02-12 DIAGNOSIS — R73.9 ELEVATED BLOOD SUGAR: ICD-10-CM

## 2020-02-12 DIAGNOSIS — Z12.5 SCREENING FOR PROSTATE CANCER: ICD-10-CM

## 2020-02-12 DIAGNOSIS — E78.5 HYPERLIPIDEMIA LDL GOAL <160: ICD-10-CM

## 2020-02-12 DIAGNOSIS — Z23 NEED FOR VACCINATION: ICD-10-CM

## 2020-02-12 DIAGNOSIS — Z01.818 PREOP GENERAL PHYSICAL EXAM: Primary | ICD-10-CM

## 2020-02-12 LAB
ALBUMIN SERPL-MCNC: 4 G/DL (ref 3.4–5)
ALP SERPL-CCNC: 66 U/L (ref 40–150)
ALT SERPL W P-5'-P-CCNC: 25 U/L (ref 0–70)
ANION GAP SERPL CALCULATED.3IONS-SCNC: 7 MMOL/L (ref 3–14)
AST SERPL W P-5'-P-CCNC: 19 U/L (ref 0–45)
BILIRUB SERPL-MCNC: 0.4 MG/DL (ref 0.2–1.3)
BUN SERPL-MCNC: 14 MG/DL (ref 7–30)
CALCIUM SERPL-MCNC: 9.3 MG/DL (ref 8.5–10.1)
CHLORIDE SERPL-SCNC: 108 MMOL/L (ref 94–109)
CHOLEST SERPL-MCNC: 272 MG/DL
CO2 SERPL-SCNC: 26 MMOL/L (ref 20–32)
CREAT SERPL-MCNC: 0.84 MG/DL (ref 0.66–1.25)
GFR SERPL CREATININE-BSD FRML MDRD: >90 ML/MIN/{1.73_M2}
GLUCOSE SERPL-MCNC: 126 MG/DL (ref 70–99)
HDLC SERPL-MCNC: 46 MG/DL
HGB BLD-MCNC: 13.9 G/DL (ref 13.3–17.7)
LDLC SERPL CALC-MCNC: 200 MG/DL
MRSA DNA SPEC QL NAA+PROBE: NEGATIVE
NONHDLC SERPL-MCNC: 226 MG/DL
POTASSIUM SERPL-SCNC: 4.5 MMOL/L (ref 3.4–5.3)
PROT SERPL-MCNC: 7.1 G/DL (ref 6.8–8.8)
PSA SERPL-ACNC: 0.76 UG/L (ref 0–4)
SODIUM SERPL-SCNC: 141 MMOL/L (ref 133–144)
SPECIMEN SOURCE: NORMAL
TRIGL SERPL-MCNC: 131 MG/DL

## 2020-02-12 PROCEDURE — G0103 PSA SCREENING: HCPCS | Performed by: FAMILY MEDICINE

## 2020-02-12 PROCEDURE — 90471 IMMUNIZATION ADMIN: CPT | Performed by: FAMILY MEDICINE

## 2020-02-12 PROCEDURE — 87640 STAPH A DNA AMP PROBE: CPT | Mod: 59 | Performed by: FAMILY MEDICINE

## 2020-02-12 PROCEDURE — 80053 COMPREHEN METABOLIC PANEL: CPT | Performed by: FAMILY MEDICINE

## 2020-02-12 PROCEDURE — 36415 COLL VENOUS BLD VENIPUNCTURE: CPT | Performed by: FAMILY MEDICINE

## 2020-02-12 PROCEDURE — 85018 HEMOGLOBIN: CPT | Performed by: FAMILY MEDICINE

## 2020-02-12 PROCEDURE — 90715 TDAP VACCINE 7 YRS/> IM: CPT | Performed by: FAMILY MEDICINE

## 2020-02-12 PROCEDURE — 80061 LIPID PANEL: CPT | Performed by: FAMILY MEDICINE

## 2020-02-12 PROCEDURE — 87641 MR-STAPH DNA AMP PROBE: CPT | Performed by: FAMILY MEDICINE

## 2020-02-12 PROCEDURE — 99214 OFFICE O/P EST MOD 30 MIN: CPT | Mod: 25 | Performed by: FAMILY MEDICINE

## 2020-02-12 RX ORDER — IBUPROFEN 800 MG/1
800 TABLET, FILM COATED ORAL
Status: ON HOLD | COMMUNITY
Start: 2018-11-01 | End: 2024-08-21

## 2020-02-12 RX ORDER — ATORVASTATIN CALCIUM 20 MG/1
20 TABLET, FILM COATED ORAL DAILY
Qty: 90 TABLET | Refills: 3 | Status: SHIPPED | OUTPATIENT
Start: 2020-02-12 | End: 2021-03-30

## 2020-02-12 ASSESSMENT — MIFFLIN-ST. JEOR: SCORE: 1695.82

## 2020-02-12 NOTE — PROGRESS NOTES
[facility]  [address] [address]  [phone]  Dept: [phone]    PRE-OP EVALUATION:  Today's date: 2020    Kevin Dover (: 1965) presents for pre-operative evaluation assessment as requested by Dr. Naidu.  He requires evaluation and anesthesia risk assessment prior to undergoing surgery/procedure for treatment of arthritis.    Proposed Surgery/ Procedure: Right partial knee replacement  Date of Surgery/ Procedure: 2020  Time of Surgery/ Procedure: 7am  Hospital/Surgical Facility: [facility]    Fax number for surgical facility: [phone]  Primary Physician: Bryn Levin  Type of Anesthesia Anticipated: to be determined    Patient has a Health Care Directive or Living Will:  YES    1. NO - Do you have a history of heart attack, stroke, stent, bypass or surgery on an artery in the head, neck, heart or legs?  2. NO - Do you ever have any pain or discomfort in your chest?  3. NO - Do you have a history of  Heart Failure?  4. NO - Are you troubled by shortness of breath when: walking on the level, up a slight hill or at night?  5. NO - Do you currently have a cold, bronchitis or other respiratory infection?  6. NO - Do you have a cough, shortness of breath or wheezing?  7. NO - Do you sometimes get pains in the calves of your legs when you walk?  8. NO - Do you or anyone in your family have previous history of blood clots?  9. NO - Do you or does anyone in your family have a serious bleeding problem such as prolonged bleeding following surgeries or cuts?  10. NO - Have you ever had problems with anemia or been told to take iron pills?  11. NO - Have you had any abnormal blood loss such as black, tarry or bloody stools, or abnormal vaginal bleeding?  12. NO - Have you ever had a blood transfusion?  13. NO - Have you or any of your relatives ever had problems with anesthesia?  14. NO - Do you have sleep apnea, excessive snoring or daytime  drowsiness?  15. NO - Do you have any prosthetic heart valves?  16. NO - Do you have prosthetic joints?  17. NO - Is there any chance that you may be pregnant?      HPI:     HPI related to upcoming procedure:       See problem list for active medical problems.  Problems all longstanding and stable, except as noted/documented.  See ROS for pertinent symptoms related to these conditions.      MEDICAL HISTORY:     Patient Active Problem List    Diagnosis Date Noted     Osteoma of external auditory canal 05/14/2018     Priority: Medium     Tinnitus, bilateral 05/14/2018     Priority: Medium     Arthritis of right knee 01/14/2018     Priority: Medium     Baker's cyst of knee, right 01/14/2018     Priority: Medium     Ventral hernia 08/26/2015     Priority: Medium     Seasonal allergic rhinitis 12/15/2011     Priority: Medium     CARDIOVASCULAR SCREENING; LDL GOAL LESS THAN 160 10/31/2010     Priority: Medium      Past Medical History:   Diagnosis Date     Conductive hearing loss      Hearing problem      NO ACTIVE PROBLEMS      Tinnitus      Past Surgical History:   Procedure Laterality Date     C NONSPECIFIC PROCEDURE      varicocoel     HERNIORRHAPHY VENTRAL N/A 3/2/2015    Procedure: HERNIORRHAPHY VENTRAL;  Surgeon: Flavio Nowak MD;  Location: [facility] OR     RHINOPLASTY       Current Outpatient Medications   Medication Sig Dispense Refill     ibuprofen (ADVIL/MOTRIN) 800 MG tablet Take 800 mg by mouth       ACETAMINOPHEN PO        OTC products: None, except as noted above    No Known Allergies   Latex Allergy: NO    Social History     Tobacco Use     Smoking status: Former Smoker     Packs/day: 1.00     Years: 20.00     Pack years: 20.00     Types: Cigarettes     Last attempt to quit: 9/1/2004     Years since quitting: 15.4     Smokeless tobacco: Never Used   Substance Use Topics     Alcohol use: No     Alcohol/week: 0.0 standard drinks     History   Drug Use No       REVIEW OF SYSTEMS:   CONSTITUTIONAL: NEGATIVE for  "fever, chills, change in weight  INTEGUMENTARY/SKIN: NEGATIVE for worrisome rashes, moles or lesions  EYES: NEGATIVE for vision changes or irritation  ENT/MOUTH: NEGATIVE for ear, mouth and throat problems  RESP: NEGATIVE for significant cough or SOB  BREAST: NEGATIVE for masses, tenderness or discharge  CV: NEGATIVE for chest pain, palpitations or peripheral edema  GI: NEGATIVE for nausea, abdominal pain, heartburn, or change in bowel habits  : NEGATIVE for frequency, dysuria, or hematuria  MUSCULOSKELETAL: NEGATIVE for significant arthralgias or myalgia  NEURO: NEGATIVE for weakness, dizziness or paresthesias  ENDOCRINE: NEGATIVE for temperature intolerance, skin/hair changes  HEME: NEGATIVE for bleeding problems  PSYCHIATRIC: NEGATIVE for changes in mood or affect    EXAM:   /80   Pulse 78   Temp 96.6  F (35.9  C) (Tympanic)   Ht 1.702 m (5' 7\")   Wt 90.7 kg (200 lb)   SpO2 98%   BMI 31.32 kg/m      GENERAL APPEARANCE: healthy, alert and no distress     EYES: EOMI,  PERRL     HENT: ear canals and TM's normal and nose and mouth without ulcers or lesions     NECK: no adenopathy, no asymmetry, masses, or scars and thyroid normal to palpation     RESP: lungs clear to auscultation - no rales, rhonchi or wheezes     CV: regular rates and rhythm, normal S1 S2, no S3 or S4 and no murmur, click or rub     ABDOMEN:  soft, nontender, no HSM or masses and bowel sounds normal     MS: extremities normal- no gross deformities noted, no evidence of inflammation in joints, FROM in all extremities.     SKIN: no suspicious lesions or rashes     NEURO: Normal strength and tone, sensory exam grossly normal, mentation intact and speech normal     PSYCH: mentation appears normal. and affect normal/bright     LYMPHATICS: No cervical adenopathy    DIAGNOSTICS:     Results for orders placed or performed in visit on 02/12/20   Comprehensive metabolic panel     Status: Abnormal   Result Value Ref Range    Sodium 141 133 - 144 " mmol/L    Potassium 4.5 3.4 - 5.3 mmol/L    Chloride 108 94 - 109 mmol/L    Carbon Dioxide 26 20 - 32 mmol/L    Anion Gap 7 3 - 14 mmol/L    Glucose 126 (H) 70 - 99 mg/dL    Urea Nitrogen 14 7 - 30 mg/dL    Creatinine 0.84 0.66 - 1.25 mg/dL    GFR Estimate >90 >60 mL/min/[1.73_m2]    GFR Estimate If Black >90 >60 mL/min/[1.73_m2]    Calcium 9.3 8.5 - 10.1 mg/dL    Bilirubin Total 0.4 0.2 - 1.3 mg/dL    Albumin 4.0 3.4 - 5.0 g/dL    Protein Total 7.1 6.8 - 8.8 g/dL    Alkaline Phosphatase 66 40 - 150 U/L    ALT 25 0 - 70 U/L    AST 19 0 - 45 U/L   Hemoglobin     Status: None   Result Value Ref Range    Hemoglobin 13.9 13.3 - 17.7 g/dL   Lipid panel reflex to direct LDL Fasting     Status: Abnormal   Result Value Ref Range    Cholesterol 272 (H) <200 mg/dL    Triglycerides 131 <150 mg/dL    HDL Cholesterol 46 >39 mg/dL    LDL Cholesterol Calculated 200 (H) <100 mg/dL    Non HDL Cholesterol 226 (H) <130 mg/dL   PSA, screen     Status: None   Result Value Ref Range    PSA 0.76 0 - 4 ug/L   Methicillin Resistant Staph Aureus PCR     Status: None   Result Value Ref Range    Specimen Description Nares     Methicillin Resist/Sens S. aureus PCR Negative NEG^Negative         Recent Labs   Lab Test 09/11/18  1019 03/09/16  1810 02/24/15  1617  12/15/11  1613   HGB 14.1  --  13.4  --  13.7   PLT  --   --  231  --  252   NA  --  140 142   < > 141   POTASSIUM  --  3.7 4.2   < > 4.0   CR  --  0.95 1.04   < > 1.01   A1C  --   --   --   --  5.9    < > = values in this interval not displayed.        IMPRESSION:   Reason for surgery/procedure:   Diagnosis/reason for consult:     ICD-10-CM    1. Preop general physical exam Z01.818 Comprehensive metabolic panel     Hemoglobin     Methicillin Resistant Staph Aureus PCR   2. Arthritis of right knee M17.11    3. Screening for prostate cancer Z12.5 PSA, screen   4. CARDIOVASCULAR SCREENING; LDL GOAL LESS THAN 160 Z13.6 Comprehensive metabolic panel     Lipid panel reflex to direct LDL  Fasting   5. Need for vaccination Z23 TDAP VACCINE (ADACEL) [62713.002]         The proposed surgical procedure is considered LOW risk.    REVISED CARDIAC RISK INDEX  The patient has the following serious cardiovascular risks for perioperative complications such as (MI, PE, VFib and 3  AV Block):  No serious cardiac risks  INTERPRETATION: 0 risks: Class I (very low risk - 0.4% complication rate)    The patient has the following additional risks for perioperative complications:  No identified additional risks      ICD-10-CM    1. Preop general physical exam Z01.818 Comprehensive metabolic panel     Hemoglobin     Methicillin Resistant Staph Aureus PCR   2. Arthritis of right knee M17.11    3. Screening for prostate cancer Z12.5 PSA, screen   4. CARDIOVASCULAR SCREENING; LDL GOAL LESS THAN 160 Z13.6 Comprehensive metabolic panel     Lipid panel reflex to direct LDL Fasting   5. Need for vaccination Z23 TDAP VACCINE (ADACEL) [20034.002]       RECOMMENDATIONS:     --Consult hospital rounder / IM to assist post-op medical management    --Patient is to take all scheduled medications on the day of surgery EXCEPT for modifications listed below.    APPROVAL GIVEN to proceed with proposed procedure, without further diagnostic evaluation       Signed Electronically by: Bryn Levin MD    Copy of this evaluation report is provided to requesting physician.    [facility] Preop Guidelines    Revised Cardiac Risk Index

## 2020-02-13 DIAGNOSIS — R73.9 ELEVATED BLOOD SUGAR: ICD-10-CM

## 2020-02-13 LAB — HBA1C MFR BLD: 5.4 % (ref 0–5.6)

## 2020-02-13 PROCEDURE — 83036 HEMOGLOBIN GLYCOSYLATED A1C: CPT | Performed by: FAMILY MEDICINE

## 2020-02-13 PROCEDURE — 36415 COLL VENOUS BLD VENIPUNCTURE: CPT | Performed by: FAMILY MEDICINE

## 2021-03-30 DIAGNOSIS — E78.5 HYPERLIPIDEMIA LDL GOAL <160: ICD-10-CM

## 2021-03-30 RX ORDER — ATORVASTATIN CALCIUM 20 MG/1
TABLET, FILM COATED ORAL
Qty: 90 TABLET | OUTPATIENT
Start: 2021-03-30

## 2021-05-06 NOTE — NURSING NOTE
"Chief Complaint   Patient presents with     Consult     bilateral tinnitus      Height 1.72 m (5' 7.72\"), weight 87.5 kg (193 lb).    Donato Bergeron LPN    "
[place]

## 2021-05-21 DIAGNOSIS — E78.5 HYPERLIPIDEMIA LDL GOAL <160: ICD-10-CM

## 2021-05-21 RX ORDER — ATORVASTATIN CALCIUM 20 MG/1
20 TABLET, FILM COATED ORAL DAILY
Qty: 10 TABLET | Refills: 0 | Status: SHIPPED | OUTPATIENT
Start: 2021-05-21 | End: 2021-05-28

## 2021-05-21 NOTE — TELEPHONE ENCOUNTER
Patient's wife calling to inform that patient has been out of atorvastatin for 1 week. She is calling for a refill so that he can resume before his scheduled appointment on 5/27 with Dr. Levin. Penny Milner RN

## 2021-05-27 ENCOUNTER — OFFICE VISIT (OUTPATIENT)
Dept: FAMILY MEDICINE | Facility: CLINIC | Age: 56
End: 2021-05-27
Payer: COMMERCIAL

## 2021-05-27 VITALS
TEMPERATURE: 97.9 F | HEART RATE: 81 BPM | WEIGHT: 187 LBS | SYSTOLIC BLOOD PRESSURE: 124 MMHG | BODY MASS INDEX: 29.35 KG/M2 | DIASTOLIC BLOOD PRESSURE: 78 MMHG | OXYGEN SATURATION: 97 % | HEIGHT: 67 IN

## 2021-05-27 DIAGNOSIS — Z11.4 SCREENING FOR HIV (HUMAN IMMUNODEFICIENCY VIRUS): ICD-10-CM

## 2021-05-27 DIAGNOSIS — E78.5 HYPERLIPIDEMIA LDL GOAL <160: Primary | ICD-10-CM

## 2021-05-27 DIAGNOSIS — H61.20 IMPACTED CERUMEN, UNSPECIFIED LATERALITY: ICD-10-CM

## 2021-05-27 DIAGNOSIS — J30.2 SEASONAL ALLERGIC RHINITIS, UNSPECIFIED TRIGGER: ICD-10-CM

## 2021-05-27 DIAGNOSIS — Z11.59 NEED FOR HEPATITIS C SCREENING TEST: ICD-10-CM

## 2021-05-27 DIAGNOSIS — Z00.00 ENCOUNTER FOR ANNUAL PHYSICAL EXAM: ICD-10-CM

## 2021-05-27 DIAGNOSIS — Z12.5 SCREENING FOR PROSTATE CANCER: ICD-10-CM

## 2021-05-27 LAB
ALBUMIN SERPL-MCNC: 3.9 G/DL (ref 3.4–5)
ALP SERPL-CCNC: 75 U/L (ref 40–150)
ALT SERPL W P-5'-P-CCNC: 17 U/L (ref 0–70)
ANION GAP SERPL CALCULATED.3IONS-SCNC: 5 MMOL/L (ref 3–14)
AST SERPL W P-5'-P-CCNC: 12 U/L (ref 0–45)
BILIRUB SERPL-MCNC: 0.6 MG/DL (ref 0.2–1.3)
BUN SERPL-MCNC: 9 MG/DL (ref 7–30)
CALCIUM SERPL-MCNC: 8.9 MG/DL (ref 8.5–10.1)
CHLORIDE SERPL-SCNC: 111 MMOL/L (ref 94–109)
CHOLEST SERPL-MCNC: 162 MG/DL
CO2 SERPL-SCNC: 26 MMOL/L (ref 20–32)
CREAT SERPL-MCNC: 0.91 MG/DL (ref 0.66–1.25)
GFR SERPL CREATININE-BSD FRML MDRD: >90 ML/MIN/{1.73_M2}
GLUCOSE SERPL-MCNC: 109 MG/DL (ref 70–99)
HCV AB SERPL QL IA: NONREACTIVE
HDLC SERPL-MCNC: 55 MG/DL
HIV 1+2 AB+HIV1 P24 AG SERPL QL IA: NONREACTIVE
LDLC SERPL CALC-MCNC: 96 MG/DL
NONHDLC SERPL-MCNC: 107 MG/DL
POTASSIUM SERPL-SCNC: 3.9 MMOL/L (ref 3.4–5.3)
PROT SERPL-MCNC: 6.9 G/DL (ref 6.8–8.8)
PSA SERPL-ACNC: 0.98 UG/L (ref 0–4)
SODIUM SERPL-SCNC: 142 MMOL/L (ref 133–144)
TRIGL SERPL-MCNC: 55 MG/DL

## 2021-05-27 PROCEDURE — 86803 HEPATITIS C AB TEST: CPT | Performed by: FAMILY MEDICINE

## 2021-05-27 PROCEDURE — 69209 REMOVE IMPACTED EAR WAX UNI: CPT | Performed by: FAMILY MEDICINE

## 2021-05-27 PROCEDURE — 36415 COLL VENOUS BLD VENIPUNCTURE: CPT | Performed by: FAMILY MEDICINE

## 2021-05-27 PROCEDURE — 99396 PREV VISIT EST AGE 40-64: CPT | Mod: 25 | Performed by: FAMILY MEDICINE

## 2021-05-27 PROCEDURE — 87389 HIV-1 AG W/HIV-1&-2 AB AG IA: CPT | Performed by: FAMILY MEDICINE

## 2021-05-27 PROCEDURE — 80061 LIPID PANEL: CPT | Performed by: FAMILY MEDICINE

## 2021-05-27 PROCEDURE — 80053 COMPREHEN METABOLIC PANEL: CPT | Performed by: FAMILY MEDICINE

## 2021-05-27 PROCEDURE — G0103 PSA SCREENING: HCPCS | Performed by: FAMILY MEDICINE

## 2021-05-27 ASSESSMENT — MIFFLIN-ST. JEOR: SCORE: 1631.86

## 2021-05-27 NOTE — NURSING NOTE
Patient identified using two patient identifiers.  Ear exam showing wax occlusion completed by provider.  Solution: warm water was placed in the right ear(s) via irrigation tool: tita FRIAS CMA  .

## 2021-05-27 NOTE — PROGRESS NOTES
SUBJECTIVE:   CC: Kevin Dover is an 55 year old male who presents for preventative health visit.     Patient has been advised of split billing requirements and indicates understanding: Yes  Healthy Habits:    Getting at least 3 servings of Calcium per day:  Yes    Bi-annual eye exam:  Yes    Dental care twice a year:  Yes    Sleep apnea or symptoms of sleep apnea:  None    Diet:  Regular (no restrictions)    Frequency of exercise:  2-3 days/week    Taking medications regularly:  Yes    Barriers to taking medications:  None    Medication side effects:  None    PHQ-2 Total Score:    Additional concerns today:  Yes (Right ear- feels like fluid in there )    Patient overall feels good.  Feeling of right ear plugged.  He has seen ENT and he was advised he may need some hearing aid.  Feeling of fluid behind the ear.          Today's PHQ-2 Score:   PHQ-2 (  [employer]) 2021   Q1: Little interest or pleasure in doing things 0   Q2: Feeling down, depressed or hopeless 0   PHQ-2 Score 0       Abuse: Current or Past(Physical, Sexual or Emotional)- No  Do you feel safe in your environment? Yes    Have you ever done Advance Care Planning? (For example, a Health Directive, POLST, or a discussion with a medical provider or your loved ones about your wishes): No, advance care planning information given to patient to review.  Advanced care planning was discussed at today's visit.    Social History     Tobacco Use     Smoking status: Former Smoker     Packs/day: 1.00     Years: 20.00     Pack years: 20.00     Types: Cigarettes     Quit date: 2004     Years since quittin.7     Smokeless tobacco: Never Used   Substance Use Topics     Alcohol use: No     Alcohol/week: 0.0 standard drinks     If you drink alcohol do you typically have >3 drinks per day or >7 drinks per week? No      No flowsheet data found.    Last PSA:   PSA   Date Value Ref Range Status   2020 0.76 0 - 4 ug/L Final     Comment:     Assay  "Method:  Chemiluminescence using Siemens Vista analyzer       Reviewed orders with patient. Reviewed health maintenance and updated orders accordingly - Yes  Lab work is in process    Reviewed and updated as needed this visit by clinical staff  Tobacco  Allergies  Meds              Reviewed and updated as needed this visit by Provider                    Review of Systems  CONSTITUTIONAL: NEGATIVE for fever, chills, change in weight  INTEGUMENTARY/SKIN: NEGATIVE for worrisome rashes, moles or lesions  EYES: NEGATIVE for vision changes or irritation  ENT: NEGATIVE for ear, mouth and throat problems  RESP: NEGATIVE for significant cough or SOB  CV: NEGATIVE for chest pain, palpitations or peripheral edema  GI: NEGATIVE for nausea, abdominal pain, heartburn, or change in bowel habits   male: negative for dysuria, hematuria, decreased urinary stream, erectile dysfunction, urethral discharge  MUSCULOSKELETAL: NEGATIVE for significant arthralgias or myalgia  NEURO: NEGATIVE for weakness, dizziness or paresthesias  PSYCHIATRIC: NEGATIVE for changes in mood or affect    OBJECTIVE:   /78   Pulse 81   Temp 97.9  F (36.6  C) (Tympanic)   Ht 1.702 m (5' 7\")   Wt 84.8 kg (187 lb)   SpO2 97%   BMI 29.29 kg/m      Physical Exam  GENERAL: healthy, alert and no distress  EYES: Eyes grossly normal to inspection, PERRL and conjunctivae and sclerae normal  HENT: right ear: wax present, nose and mouth without ulcers or lesions, oropharynx clear and oral mucous membranes moist  NECK: no adenopathy, no asymmetry, masses, or scars and thyroid normal to palpation  RESP: lungs clear to auscultation - no rales, rhonchi or wheezes  CV: regular rate and rhythm, normal S1 S2, no S3 or S4, no murmur, click or rub, no peripheral edema and peripheral pulses strong  ABDOMEN: soft, nontender, no hepatosplenomegaly, no masses and bowel sounds normal  MS: no gross musculoskeletal defects noted, no edema  SKIN: no suspicious lesions or " "rashes  NEURO: Normal strength and tone, mentation intact and speech normal  PSYCH: mentation appears normal, affect normal/bright        ASSESSMENT/PLAN:   1. Screening for HIV (human immunodeficiency virus)    - HIV Antigen Antibody Combo    2. Need for hepatitis C screening test    - Hepatitis C Screen Reflex to HCV RNA Quant and Genotype    3. Hyperlipidemia LDL goal <160  Labs ordered, will follow up on that. Will refill his medication once labs reviewed.  - Lipid panel reflex to direct LDL Fasting  - Comprehensive metabolic panel (BMP + Alb, Alk Phos, ALT, AST, Total. Bili, TP)    4. Encounter for annual physical exam      5. Screening for prostate cancer    - PSA, screen    6. Seasonal allergic rhinitis, unspecified trigger      7. Impacted cerumen, unspecified laterality  Patient had wax on the right side.  Which was removed using warm water flushes.  Post clinic tympanic membrane looks normal.  I do not appreciate any signs of infection.  Advised if he continued to feel that he should see ENT and get hearing aid.      Patient has been advised of split billing requirements and indicates understanding: Yes  COUNSELING:   Reviewed preventive health counseling, as reflected in patient instructions       Regular exercise       Healthy diet/nutrition    Estimated body mass index is 29.29 kg/m  as calculated from the following:    Height as of this encounter: 1.702 m (5' 7\").    Weight as of this encounter: 84.8 kg (187 lb).         He reports that he quit smoking about 16 years ago. His smoking use included cigarettes. He has a 20.00 pack-year smoking history. He has never used smokeless tobacco.      Counseling Resources:  ATP IV Guidelines  Pooled Cohorts Equation Calculator  FRAX Risk Assessment  ICSI Preventive Guidelines  Dietary Guidelines for Americans, 2010  [facility] [place]'s MyPlate  ASA Prophylaxis  Lung CA Screening    Bryn Levin MD  [facility]  "

## 2021-05-27 NOTE — LETTER
May 28, 2021      Kevin Dover  [address] [place] [place] [address]        Dear ,    We are writing to inform you of your test results.    -PSA (prostate specific antigen) test is normal.  This indicates a low likelihood of prostate cancer.  ADVISE: rechecking this in 1 year.   -Cholesterol levels (LDL,HDL, Triglycerides) are normal.  ADVISE: rechecking in 1 year. Medication refilled   -Liver and gallbladder tests are normal (ALT,AST, Alk phos, bilirubin), kidney function is normal (Cr, GFR), sodium is normal, potassium is normal, calcium is normal, glucose is not in diabetic range.   -Hepatitis C antibody screen test shows no signs of a previous hepatitis C infection.   -HIV test is normal.     Resulted Orders   HIV Antigen Antibody Combo   Result Value Ref Range    HIV Antigen Antibody Combo Nonreactive NR^Nonreactive          Comment:      HIV-1 p24 Ag & HIV-1/HIV-2 Ab Not Detected   Hepatitis C Screen Reflex to HCV RNA Quant and Genotype   Result Value Ref Range    Hepatitis C Antibody Nonreactive NR^Nonreactive      Comment:      Assay performance characteristics have not been established for newborns,   infants, and children     Lipid panel reflex to direct LDL Fasting   Result Value Ref Range    Cholesterol 162 <200 mg/dL    Triglycerides 55 <150 mg/dL      Comment:      Fasting specimen    HDL Cholesterol 55 >39 mg/dL    LDL Cholesterol Calculated 96 <100 mg/dL      Comment:      Desirable:       <100 mg/dl    Non HDL Cholesterol 107 <130 mg/dL   Comprehensive metabolic panel (BMP + Alb, Alk Phos, ALT, AST, Total. Bili, TP)   Result Value Ref Range    Sodium 142 133 - 144 mmol/L    Potassium 3.9 3.4 - 5.3 mmol/L    Chloride 111 (H) 94 - 109 mmol/L    Carbon Dioxide 26 20 - 32 mmol/L    Anion Gap 5 3 - 14 mmol/L    Glucose 109 (H) 70 - 99 mg/dL      Comment:      Fasting specimen    Urea Nitrogen 9 7 - 30 mg/dL    Creatinine 0.91 0.66 - 1.25 mg/dL    GFR Estimate >90 >60 mL/min/[1.73_m2]       Comment:      Non  GFR Calc  Starting 12/18/2018, serum creatinine based estimated GFR (eGFR) will be   calculated using the Chronic Kidney Disease Epidemiology Collaboration   (CKD-EPI) equation.      GFR Estimate If Black >90 >60 mL/min/[1.73_m2]      Comment:       GFR Calc  Starting 12/18/2018, serum creatinine based estimated GFR (eGFR) will be   calculated using the Chronic Kidney Disease Epidemiology Collaboration   (CKD-EPI) equation.      Calcium 8.9 8.5 - 10.1 mg/dL    Bilirubin Total 0.6 0.2 - 1.3 mg/dL    Albumin 3.9 3.4 - 5.0 g/dL    Protein Total 6.9 6.8 - 8.8 g/dL    Alkaline Phosphatase 75 40 - 150 U/L    ALT 17 0 - 70 U/L    AST 12 0 - 45 U/L   PSA, screen   Result Value Ref Range    PSA 0.98 0 - 4 ug/L      Comment:      Assay Method:  Chemiluminescence using Siemens Vista analyzer       If you have any questions or concerns, please call the clinic at the number listed above.       Sincerely,      Bryn Levin MD

## 2021-05-28 DIAGNOSIS — E78.5 HYPERLIPIDEMIA LDL GOAL <160: ICD-10-CM

## 2021-05-28 RX ORDER — ATORVASTATIN CALCIUM 20 MG/1
20 TABLET, FILM COATED ORAL DAILY
Qty: 90 TABLET | Refills: 3 | Status: SHIPPED | OUTPATIENT
Start: 2021-05-28 | End: 2022-06-30

## 2021-06-01 DIAGNOSIS — E78.5 HYPERLIPIDEMIA LDL GOAL <160: ICD-10-CM

## 2021-06-03 RX ORDER — ATORVASTATIN CALCIUM 20 MG/1
TABLET, FILM COATED ORAL
Qty: 10 TABLET | OUTPATIENT
Start: 2021-06-03

## 2021-10-16 ENCOUNTER — NURSE TRIAGE (OUTPATIENT)
Dept: NURSING | Facility: CLINIC | Age: 56
End: 2021-10-16

## 2021-10-16 NOTE — TELEPHONE ENCOUNTER
Bumps on head that are concerning. They hurt. One is by ear, on neck line, looked on line. Wondering if it's ring worm. They aren't in a perfect [place]. He has three of the bumps. One on his neck is more like you can feel it to touch, red. One under hair that has swelling. They want a clinic appointment so I connected with scheduling for Monday. [place] so won't seek out local urgent care.  Dorie Schafer RN  [facility]      Reason for Disposition    2 or more boils    Additional Information    Negative: [1] Widespread rash AND [2] bright red, sunburn-like AND [3] too weak to stand    Negative: Sounds like a life-threatening emergency to the triager    Negative: Painful lump or swelling at opening to anus (rectum)    Negative: Painful lump or swelling at opening to vagina (on labia)    Negative: Painful lump or swelling on scrotum    Negative: Impetigo suspected or diagnosed    Negative: Doesn't match the SYMPTOMS of a boil    Negative: MRSA, questions about (No boil or other skin lesion)    Negative: Widespread red rash    Negative: Black (necrotic) color or blisters develop in wound    Negative: Patient sounds very sick or weak to the triager    Negative: SEVERE pain (e.g., excruciating)     Pain at 5    Negative: Red streak from area of infection    Negative: Fever > 100.4 F (38.0 C)    Negative: Boil > 2 inches across (> 5 cm; larger than a golf ball or ping pong ball)    Negative: [1] Boil > 1/2 inch across (> 12 mm; larger than a marble) AND [2] center is soft or pus colored    Negative: [1] Boil > 1/4 inch across (> 6 mm; larger than a pencil eraser) AND [2] on face    Negative: [1] Boil AND [2] diabetes mellitus or weak immune system (e.g., HIV positive, cancer chemo, splenectomy, organ transplant, chronic steroids)    Negative: Boil overlying a joint    Protocols used: BOIL (SKIN ABSCESS)-A-

## 2021-10-18 ENCOUNTER — OFFICE VISIT (OUTPATIENT)
Dept: FAMILY MEDICINE | Facility: CLINIC | Age: 56
End: 2021-10-18
Payer: COMMERCIAL

## 2021-10-18 VITALS
WEIGHT: 195 LBS | OXYGEN SATURATION: 96 % | BODY MASS INDEX: 30.54 KG/M2 | SYSTOLIC BLOOD PRESSURE: 132 MMHG | TEMPERATURE: 97 F | DIASTOLIC BLOOD PRESSURE: 86 MMHG | HEART RATE: 88 BPM

## 2021-10-18 DIAGNOSIS — L73.9 FOLLICULITIS: Primary | ICD-10-CM

## 2021-10-18 DIAGNOSIS — Z23 NEED FOR VACCINATION: ICD-10-CM

## 2021-10-18 PROCEDURE — 99213 OFFICE O/P EST LOW 20 MIN: CPT | Mod: 25 | Performed by: PHYSICIAN ASSISTANT

## 2021-10-18 PROCEDURE — 90471 IMMUNIZATION ADMIN: CPT | Performed by: PHYSICIAN ASSISTANT

## 2021-10-18 PROCEDURE — 90750 HZV VACC RECOMBINANT IM: CPT | Performed by: PHYSICIAN ASSISTANT

## 2021-10-18 RX ORDER — DOXYCYCLINE 100 MG/1
100 CAPSULE ORAL 2 TIMES DAILY
Qty: 20 CAPSULE | Refills: 0 | Status: SHIPPED | OUTPATIENT
Start: 2021-10-18 | End: 2021-10-28

## 2021-10-18 ASSESSMENT — PAIN SCALES - GENERAL: PAINLEVEL: NO PAIN (0)

## 2021-10-18 NOTE — PROGRESS NOTES
Assessment & Plan     Folliculitis  Symptoms are consistent with folliculitis. Given the size of the nodules, I am going to prescribe oral abx at this time.  He will follow up if new or worsening symptoms  - doxycycline hyclate (VIBRAMYCIN) 100 MG capsule; Take 1 capsule (100 mg) by mouth 2 times daily for 10 days    Need for vaccination  - SHINGRIX [[id number]]        Return for if new or worsening symtoms. .    IRWIN Costello [facility] HERMELINDO Brown is a 56 year old who presents for the following health issues     HPI     Concern - red bumps  Onset: 1 week  Description: red bumps on back of head right side  Intensity: moderate  Progression of Symptoms:  same  Accompanying Signs & Symptoms: pain  Previous history of similar problem: none  Precipitating factors:        Worsened by: none  Alleviating factors:        Improved by: none  Therapies tried and outcome: stone Brown presents to the clinic with painful erythematous bumps on the left side of his occipital scalp for about 1 week that occurred 2 days after he got a haircut.  He has not had any oozing or drainage, no fevers.  The bumps are painful and somewhat pruritic.        Review of Systems   Constitutional, HEENT, cardiovascular, pulmonary, gi and gu systems are negative, except as otherwise noted.      Objective    /86 (Cuff Size: Adult Large)   Pulse 88   Temp 97  F (36.1  C) (Tympanic)   Wt 88.5 kg (195 lb)   SpO2 96%   BMI 30.54 kg/m    Body mass index is 30.54 kg/m .  Physical Exam   GENERAL: healthy, alert and no distress  SKIN: 3 small, erythematous indurated nodules noted to the left occiput without fluctuance.  These are TTP

## 2021-12-27 ENCOUNTER — ALLIED HEALTH/NURSE VISIT (OUTPATIENT)
Dept: FAMILY MEDICINE | Facility: CLINIC | Age: 56
End: 2021-12-27
Payer: COMMERCIAL

## 2021-12-27 DIAGNOSIS — Z23 NEED FOR VACCINATION: Primary | ICD-10-CM

## 2021-12-27 PROCEDURE — 99207 PR NO CHARGE NURSE ONLY: CPT

## 2021-12-27 PROCEDURE — 90471 IMMUNIZATION ADMIN: CPT

## 2021-12-27 PROCEDURE — 90750 HZV VACC RECOMBINANT IM: CPT

## 2022-06-28 DIAGNOSIS — E78.5 HYPERLIPIDEMIA LDL GOAL <160: ICD-10-CM

## 2022-06-30 RX ORDER — ATORVASTATIN CALCIUM 20 MG/1
TABLET, FILM COATED ORAL
Qty: 90 TABLET | Refills: 0 | Status: SHIPPED | OUTPATIENT
Start: 2022-06-30 | End: 2022-09-06

## 2022-09-06 ENCOUNTER — OFFICE VISIT (OUTPATIENT)
Dept: FAMILY MEDICINE | Facility: CLINIC | Age: 57
End: 2022-09-06
Payer: COMMERCIAL

## 2022-09-06 VITALS
SYSTOLIC BLOOD PRESSURE: 130 MMHG | WEIGHT: 199.6 LBS | BODY MASS INDEX: 30.25 KG/M2 | HEART RATE: 73 BPM | DIASTOLIC BLOOD PRESSURE: 80 MMHG | OXYGEN SATURATION: 100 % | TEMPERATURE: 97.4 F | HEIGHT: 68 IN | RESPIRATION RATE: 16 BRPM

## 2022-09-06 DIAGNOSIS — E78.5 HYPERLIPIDEMIA LDL GOAL <160: Primary | ICD-10-CM

## 2022-09-06 DIAGNOSIS — Z12.5 SCREENING FOR PROSTATE CANCER: ICD-10-CM

## 2022-09-06 DIAGNOSIS — E78.5 HYPERLIPIDEMIA LDL GOAL <160: ICD-10-CM

## 2022-09-06 DIAGNOSIS — Z23 FLU VACCINE NEED: ICD-10-CM

## 2022-09-06 DIAGNOSIS — Z00.00 ENCOUNTER FOR ANNUAL PHYSICAL EXAM: ICD-10-CM

## 2022-09-06 LAB
ALBUMIN SERPL-MCNC: 3.8 G/DL (ref 3.4–5)
ALP SERPL-CCNC: 71 U/L (ref 40–150)
ALT SERPL W P-5'-P-CCNC: 16 U/L (ref 0–70)
ANION GAP SERPL CALCULATED.3IONS-SCNC: 5 MMOL/L (ref 3–14)
AST SERPL W P-5'-P-CCNC: 13 U/L (ref 0–45)
BILIRUB SERPL-MCNC: 0.6 MG/DL (ref 0.2–1.3)
BUN SERPL-MCNC: 10 MG/DL (ref 7–30)
CALCIUM SERPL-MCNC: 9.1 MG/DL (ref 8.5–10.1)
CHLORIDE BLD-SCNC: 108 MMOL/L (ref 94–109)
CHOLEST SERPL-MCNC: 128 MG/DL
CO2 SERPL-SCNC: 26 MMOL/L (ref 20–32)
CREAT SERPL-MCNC: 0.98 MG/DL (ref 0.66–1.25)
FASTING STATUS PATIENT QL REPORTED: YES
GFR SERPL CREATININE-BSD FRML MDRD: 89 ML/MIN/1.73M2
GLUCOSE BLD-MCNC: 124 MG/DL (ref 70–99)
HDLC SERPL-MCNC: 48 MG/DL
LDLC SERPL CALC-MCNC: 70 MG/DL
NONHDLC SERPL-MCNC: 80 MG/DL
POTASSIUM BLD-SCNC: 4.2 MMOL/L (ref 3.4–5.3)
PROT SERPL-MCNC: 6.7 G/DL (ref 6.8–8.8)
PSA SERPL-MCNC: 1.05 UG/L (ref 0–4)
SODIUM SERPL-SCNC: 139 MMOL/L (ref 133–144)
TRIGL SERPL-MCNC: 51 MG/DL

## 2022-09-06 PROCEDURE — 99396 PREV VISIT EST AGE 40-64: CPT | Mod: 25 | Performed by: FAMILY MEDICINE

## 2022-09-06 PROCEDURE — 80053 COMPREHEN METABOLIC PANEL: CPT | Performed by: FAMILY MEDICINE

## 2022-09-06 PROCEDURE — 90682 RIV4 VACC RECOMBINANT DNA IM: CPT | Performed by: FAMILY MEDICINE

## 2022-09-06 PROCEDURE — 90471 IMMUNIZATION ADMIN: CPT | Performed by: FAMILY MEDICINE

## 2022-09-06 PROCEDURE — G0103 PSA SCREENING: HCPCS | Performed by: FAMILY MEDICINE

## 2022-09-06 PROCEDURE — 36415 COLL VENOUS BLD VENIPUNCTURE: CPT | Performed by: FAMILY MEDICINE

## 2022-09-06 PROCEDURE — 80061 LIPID PANEL: CPT | Performed by: FAMILY MEDICINE

## 2022-09-06 RX ORDER — ATORVASTATIN CALCIUM 20 MG/1
20 TABLET, FILM COATED ORAL DAILY
Qty: 90 TABLET | Refills: 3 | Status: SHIPPED | OUTPATIENT
Start: 2022-09-06 | End: 2023-12-06

## 2022-09-06 ASSESSMENT — ENCOUNTER SYMPTOMS
NERVOUS/ANXIOUS: 0
SORE THROAT: 0
PARESTHESIAS: 0
COUGH: 0
NAUSEA: 0
JOINT SWELLING: 0
ABDOMINAL PAIN: 0
EYE PAIN: 0
CHILLS: 0
FEVER: 0
ARTHRALGIAS: 0
HEMATOCHEZIA: 0
CONSTIPATION: 0
WEAKNESS: 0
HEMATURIA: 0
FREQUENCY: 0
SHORTNESS OF BREATH: 0
HEARTBURN: 0
PALPITATIONS: 0
DYSURIA: 0
DIARRHEA: 0
HEADACHES: 0
DIZZINESS: 0
MYALGIAS: 0

## 2022-09-06 ASSESSMENT — PAIN SCALES - GENERAL: PAINLEVEL: NO PAIN (0)

## 2022-09-06 NOTE — PROGRESS NOTES
SUBJECTIVE:   CC: Kevin Dover is an 57 year old male who presents for preventative health visit.       Patient has been advised of split billing requirements and indicates understanding: Yes  Healthy Habits:     Getting at least 3 servings of Calcium per day:  NO    Bi-annual eye exam:  Yes    Dental care twice a year:  Yes    Sleep apnea or symptoms of sleep apnea:  None    Diet:  Regular (no restrictions) and Low fat/cholesterol    Frequency of exercise:  4-5 days/week    Duration of exercise:  15-30 minutes    Taking medications regularly:  Yes    Medication side effects:  Not applicable    PHQ-2 Total Score: 0    Additional concerns today:  No     no concerns.    Today's PHQ-2 Score:   PHQ-2 (  [employer]) 2022   Q1: Little interest or pleasure in doing things 0   Q2: Feeling down, depressed or hopeless 0   PHQ-2 Score 0   PHQ-2 Total Score (12-17 Years)- Positive if 3 or more points; Administer PHQ-A if positive -   Q1: Little interest or pleasure in doing things Not at all   Q2: Feeling down, depressed or hopeless Not at all   PHQ-2 Score 0       Abuse: Current or Past(Physical, Sexual or Emotional)- No  Do you feel safe in your environment? Yes        Social History     Tobacco Use     Smoking status: Former Smoker     Packs/day: 1.00     Years: 20.00     Pack years: 20.00     Types: Cigarettes     Quit date: 2004     Years since quittin.0     Smokeless tobacco: Never Used   Substance Use Topics     Alcohol use: No     Alcohol/week: 0.0 standard drinks     If you drink alcohol do you typically have >3 drinks per day or >7 drinks per week? No    Alcohol Use 2022   Prescreen: >3 drinks/day or >7 drinks/week? No   Prescreen: >3 drinks/day or >7 drinks/week? -       Last PSA:   PSA   Date Value Ref Range Status   2021 0.98 0 - 4 ug/L Final     Comment:     Assay Method:  Chemiluminescence using Siemens Vista analyzer       Reviewed orders with patient. Reviewed health maintenance and  "updated orders accordingly - Yes  Lab work is in process    Reviewed and updated as needed this visit by clinical staff   Tobacco  Allergies  Meds                Reviewed and updated as needed this visit by Provider                       Review of Systems   Constitutional: Negative for chills and fever.   HENT: Positive for hearing loss. Negative for congestion, ear pain and sore throat.    Eyes: Negative for pain and visual disturbance.   Respiratory: Negative for cough and shortness of breath.    Cardiovascular: Negative for chest pain, palpitations and peripheral edema.   Gastrointestinal: Negative for abdominal pain, constipation, diarrhea, heartburn, hematochezia and nausea.   Genitourinary: Negative for dysuria, frequency, genital sores, hematuria, impotence, penile discharge and urgency.   Musculoskeletal: Negative for arthralgias, joint swelling and myalgias.   Skin: Negative for rash.   Neurological: Negative for dizziness, weakness, headaches and paresthesias.   Psychiatric/Behavioral: Negative for mood changes. The patient is not nervous/anxious.        OBJECTIVE:   /80   Pulse 73   Temp 97.4  F (36.3  C) (Tympanic)   Resp 16   Ht 1.735 m (5' 8.31\")   Wt 90.5 kg (199 lb 9.6 oz)   SpO2 100%   BMI 30.08 kg/m      Physical Exam  GENERAL: healthy, alert and no distress  EYES: Eyes grossly normal to inspection, PERRL and conjunctivae and sclerae normal  HENT: ear canals and TM's normal, nose and mouth without ulcers or lesions  NECK: no adenopathy, no asymmetry, masses, or scars and thyroid normal to palpation  RESP: lungs clear to auscultation - no rales, rhonchi or wheezes  CV: regular rate and rhythm, normal S1 S2, no S3 or S4, no murmur, click or rub, no peripheral edema and peripheral pulses strong  ABDOMEN: soft, nontender, no hepatosplenomegaly, no masses and bowel sounds normal  MS: no gross musculoskeletal defects noted, no edema  SKIN: no suspicious lesions or rashes  NEURO: Normal " "strength and tone, mentation intact and speech normal  PSYCH: mentation appears normal, affect normal/bright    Diagnostic Test Results:  Labs reviewed in Epic    ASSESSMENT/PLAN:   Kevin was seen today for physical.    Diagnoses and all orders for this visit:    Hyperlipidemia LDL goal <160  -     Comprehensive metabolic panel; Future  -     Lipid Profile; Future    Encounter for annual physical exam  -     Comprehensive metabolic panel; Future  -     Lipid Profile; Future  -     PSA, screen; Future  Will refill medications once labs reviewed.  Screening for prostate cancer  -     PSA, screen; Future    Flu vaccine need  -     INFLUENZA QUAD, PF (RIV4) (FLUBLOK)    Other orders  -     REVIEW OF HEALTH MAINTENANCE PROTOCOL ORDERS        Patient has been advised of split billing requirements and indicates understanding: Yes    COUNSELING:   Reviewed preventive health counseling, as reflected in patient instructions       Regular exercise       Healthy diet/nutrition    Estimated body mass index is 30.08 kg/m  as calculated from the following:    Height as of this encounter: 1.735 m (5' 8.31\").    Weight as of this encounter: 90.5 kg (199 lb 9.6 oz).         He reports that he quit smoking about 18 years ago. His smoking use included cigarettes. He has a 20.00 pack-year smoking history. He has never used smokeless tobacco.      Counseling Resources:  ATP IV Guidelines  Pooled Cohorts Equation Calculator  FRAX Risk Assessment  ICSI Preventive Guidelines  Dietary Guidelines for Americans, 2010  USDA's MyPlate  ASA Prophylaxis  Lung CA Screening    Bryn Levin MD  [facility]  "

## 2022-09-06 NOTE — LETTER
September 9, 2022      Kevin Dover  [address] [place] [address]        Dear ,    We are writing to inform you of your test results.    I have reviewed your recent labs. Here are the results:   PSA (prostate specific antigen) test is normal.  This indicates a low likelihood of prostate cancer.  ADVISE: rechecking this in 1 year.   Cholesterol levels are at your goal levels.  ADVISE: continuing your medication, a regular exercise program with at least 150 minutes of aerobic exercise per week, and eating a low saturated fat/low carbohydrate diet.  Also, you should recheck this fasting cholesterol panel in 12 months.   -Kidney function (GFR) is normal.   -Sodium is normal.   -Potassium is normal.   -Calcium is normal.   -Glucose is slight elevated and may be a sign of early diabetes (prediabetes). ADVISE:: eating a low carbohydrate diet, exercising, trying to lose weight (if necessary) and rechecking your glucose level in 6 months.    Resulted Orders   Comprehensive metabolic panel   Result Value Ref Range    Sodium 139 133 - 144 mmol/L    Potassium 4.2 3.4 - 5.3 mmol/L    Chloride 108 94 - 109 mmol/L    Carbon Dioxide (CO2) 26 20 - 32 mmol/L    Anion Gap 5 3 - 14 mmol/L    Urea Nitrogen 10 7 - 30 mg/dL    Creatinine 0.98 0.66 - 1.25 mg/dL    Calcium 9.1 8.5 - 10.1 mg/dL    Glucose 124 (H) 70 - 99 mg/dL    Alkaline Phosphatase 71 40 - 150 U/L    AST 13 0 - 45 U/L    ALT 16 0 - 70 U/L    Protein Total 6.7 (L) 6.8 - 8.8 g/dL    Albumin 3.8 3.4 - 5.0 g/dL    Bilirubin Total 0.6 0.2 - 1.3 mg/dL    GFR Estimate 89 >60 mL/min/1.73m2      Comment:      Effective December 21, 2021 eGFRcr in adults is calculated using the 2021 CKD-EPI creatinine equation which includes age and gender (Michele solis al., NEJ, DOI: 10.1056/OYXTeq8959696)   Lipid Profile   Result Value Ref Range    Cholesterol 128 <200 mg/dL    Triglycerides 51 <150 mg/dL    Direct Measure HDL 48 >=40 mg/dL    LDL Cholesterol Calculated 70  <=100 mg/dL    Non HDL Cholesterol 80 <130 mg/dL    Patient Fasting > 8hrs? Yes     Narrative    Cholesterol  Desirable:  <200 mg/dL    Triglycerides  Normal:  Less than 150 mg/dL  Borderline High:  150-199 mg/dL  High:  200-499 mg/dL  Very High:  Greater than or equal to 500 mg/dL    Direct Measure HDL  Female:  Greater than or equal to 50 mg/dL   Male:  Greater than or equal to 40 mg/dL    LDL Cholesterol  Desirable:  <100mg/dL  Above Desirable:  100-129 mg/dL   Borderline High:  130-159 mg/dL   High:  160-189 mg/dL   Very High:  >= 190 mg/dL    Non HDL Cholesterol  Desirable:  130 mg/dL  Above Desirable:  130-159 mg/dL  Borderline High:  160-189 mg/dL  High:  190-219 mg/dL  Very High:  Greater than or equal to 220 mg/dL   PSA, screen   Result Value Ref Range    Prostate Specific Antigen Screen 1.05 0.00 - 4.00 ug/L     If you have any questions or concerns, please call the clinic at the number listed above.       Sincerely,    Bryn Levin MD

## 2023-08-07 ENCOUNTER — PATIENT OUTREACH (OUTPATIENT)
Dept: CARE COORDINATION | Facility: CLINIC | Age: 58
End: 2023-08-07
Payer: COMMERCIAL

## 2023-08-21 ENCOUNTER — PATIENT OUTREACH (OUTPATIENT)
Dept: CARE COORDINATION | Facility: CLINIC | Age: 58
End: 2023-08-21
Payer: COMMERCIAL

## 2023-12-06 DIAGNOSIS — E78.5 HYPERLIPIDEMIA LDL GOAL <160: ICD-10-CM

## 2023-12-06 RX ORDER — ATORVASTATIN CALCIUM 20 MG/1
20 TABLET, FILM COATED ORAL DAILY
Qty: 90 TABLET | Refills: 0 | Status: SHIPPED | OUTPATIENT
Start: 2023-12-06 | End: 2024-01-19

## 2023-12-06 NOTE — TELEPHONE ENCOUNTER
"Pt is out of atorvastatin and states he has been for \"a while now\", pt has scheduled appt with you on 1/18/23.    Is provider okay to refill until pt is able to come in?     If so pharmacy and medication pended.    Bethany Leiva RN    "

## 2024-01-18 ENCOUNTER — OFFICE VISIT (OUTPATIENT)
Dept: FAMILY MEDICINE | Facility: CLINIC | Age: 59
End: 2024-01-18
Payer: COMMERCIAL

## 2024-01-18 VITALS
RESPIRATION RATE: 18 BRPM | OXYGEN SATURATION: 95 % | DIASTOLIC BLOOD PRESSURE: 70 MMHG | BODY MASS INDEX: 29.53 KG/M2 | HEART RATE: 110 BPM | TEMPERATURE: 99.3 F | WEIGHT: 196 LBS | SYSTOLIC BLOOD PRESSURE: 102 MMHG

## 2024-01-18 DIAGNOSIS — Z12.5 SCREENING FOR PROSTATE CANCER: ICD-10-CM

## 2024-01-18 DIAGNOSIS — R73.9 ELEVATED BLOOD SUGAR: ICD-10-CM

## 2024-01-18 DIAGNOSIS — E78.5 HYPERLIPIDEMIA LDL GOAL <160: Primary | ICD-10-CM

## 2024-01-18 LAB — HBA1C MFR BLD: 6.1 % (ref 0–5.6)

## 2024-01-18 PROCEDURE — 80053 COMPREHEN METABOLIC PANEL: CPT | Performed by: FAMILY MEDICINE

## 2024-01-18 PROCEDURE — 99214 OFFICE O/P EST MOD 30 MIN: CPT | Mod: 25 | Performed by: FAMILY MEDICINE

## 2024-01-18 PROCEDURE — 36415 COLL VENOUS BLD VENIPUNCTURE: CPT | Performed by: FAMILY MEDICINE

## 2024-01-18 PROCEDURE — 90471 IMMUNIZATION ADMIN: CPT | Performed by: FAMILY MEDICINE

## 2024-01-18 PROCEDURE — 80061 LIPID PANEL: CPT | Performed by: FAMILY MEDICINE

## 2024-01-18 PROCEDURE — 90678 RSV VACC PREF BIVALENT IM: CPT | Performed by: FAMILY MEDICINE

## 2024-01-18 PROCEDURE — G0103 PSA SCREENING: HCPCS | Performed by: FAMILY MEDICINE

## 2024-01-18 PROCEDURE — 83036 HEMOGLOBIN GLYCOSYLATED A1C: CPT | Performed by: FAMILY MEDICINE

## 2024-01-18 ASSESSMENT — PATIENT HEALTH QUESTIONNAIRE - PHQ9
10. IF YOU CHECKED OFF ANY PROBLEMS, HOW DIFFICULT HAVE THESE PROBLEMS MADE IT FOR YOU TO DO YOUR WORK, TAKE CARE OF THINGS AT HOME, OR GET ALONG WITH OTHER PEOPLE: NOT DIFFICULT AT ALL
SUM OF ALL RESPONSES TO PHQ QUESTIONS 1-9: 0
SUM OF ALL RESPONSES TO PHQ QUESTIONS 1-9: 0

## 2024-01-18 ASSESSMENT — PAIN SCALES - GENERAL: PAINLEVEL: NO PAIN (0)

## 2024-01-18 NOTE — LETTER
January 22, 2024      Kevin Dover  [address] [place] [address]        Dear ,    We are writing to inform you of your test results.    -PSA (prostate specific antigen) test is normal.  This indicates a low likelihood of prostate cancer.  ADVISE: rechecking this in 1 year.   -Cholesterol levels (LDL,HDL, Triglycerides) are normal.  ADVISE: rechecking in 1 year.   -Liver and gallbladder tests are normal (ALT,AST, Alk phos, bilirubin), kidney function is normal (Cr, GFR), sodium is normal, potassium is normal, calcium is normal, glucose is not in diabetic range.   -3-month blood sugar is not in diabetic range in prediabetes range.  Work on your diet exercise eat healthy will help.  No new medication for that.       Resulted Orders   Lipid panel reflex to direct LDL Fasting   Result Value Ref Range    Cholesterol 147 <200 mg/dL    Triglycerides 71 <150 mg/dL    Direct Measure HDL 45 >=40 mg/dL    LDL Cholesterol Calculated 88 <=100 mg/dL    Non HDL Cholesterol 102 <130 mg/dL    Patient Fasting > 8hrs? Yes     Narrative    Cholesterol  Desirable:  <200 mg/dL    Triglycerides  Normal:  Less than 150 mg/dL  Borderline High:  150-199 mg/dL  High:  200-499 mg/dL  Very High:  Greater than or equal to 500 mg/dL    Direct Measure HDL  Female:  Greater than or equal to 50 mg/dL   Male:  Greater than or equal to 40 mg/dL    LDL Cholesterol  Desirable:  <100mg/dL  Above Desirable:  100-129 mg/dL   Borderline High:  130-159 mg/dL   High:  160-189 mg/dL   Very High:  >= 190 mg/dL    Non HDL Cholesterol  Desirable:  130 mg/dL  Above Desirable:  130-159 mg/dL  Borderline High:  160-189 mg/dL  High:  190-219 mg/dL  Very High:  Greater than or equal to 220 mg/dL   Prostate Specific Antigen Screen   Result Value Ref Range    Prostate Specific Antigen Screen 0.90 0.00 - 4.50 ng/mL    Narrative    This result is obtained using the Roche Elecsys total PSA method on the lissa e801 immunoassay analyzer. Results obtained  with different assay methods or kits cannot be used interchangeably.   Comprehensive metabolic panel   Result Value Ref Range    Sodium 138 135 - 145 mmol/L      Comment:      Reference intervals for this test were updated on 09/26/2023 to more accurately reflect our healthy population. There may be differences in the flagging of prior results with similar values performed with this method. Interpretation of those prior results can be made in the context of the updated reference intervals.     Potassium 4.4 3.4 - 5.3 mmol/L    Carbon Dioxide (CO2) 25 22 - 29 mmol/L    Anion Gap 10 7 - 15 mmol/L    Urea Nitrogen 11.9 8.0 - 23.0 mg/dL    Creatinine 1.07 0.67 - 1.17 mg/dL    GFR Estimate 79 >60 mL/min/1.73m2    Calcium 9.3 8.8 - 10.2 mg/dL    Chloride 103 98 - 107 mmol/L    Glucose 114 (H) 70 - 99 mg/dL    Alkaline Phosphatase 65 40 - 150 U/L      Comment:      Reference intervals for this test were updated on 11/14/2023 to more accurately reflect our healthy population. There may be differences in the flagging of prior results with similar values performed with this method. Interpretation of those prior results can be made in the context of the updated reference intervals.    AST 18 0 - 45 U/L      Comment:      Reference intervals for this test were updated on 6/12/2023 to more accurately reflect our healthy population. There may be differences in the flagging of prior results with similar values performed with this method. Interpretation of those prior results can be made in the context of the updated reference intervals.    ALT 13 0 - 70 U/L      Comment:      Reference intervals for this test were updated on 6/12/2023 to more accurately reflect our healthy population. There may be differences in the flagging of prior results with similar values performed with this method. Interpretation of those prior results can be made in the context of the updated reference intervals.      Protein Total 7.5 6.4 - 8.3 g/dL     Albumin 4.5 3.5 - 5.2 g/dL    Bilirubin Total 0.4 <=1.2 mg/dL   Hemoglobin A1c   Result Value Ref Range    Hemoglobin A1C 6.1 (H) 0.0 - 5.6 %      Comment:      Normal <5.7%   Prediabetes 5.7-6.4%    Diabetes 6.5% or higher     Note: Adopted from ADA consensus guidelines.       If you have any questions or concerns, please call the clinic at the number listed above.       Sincerely,      Bryn Levin MD

## 2024-01-18 NOTE — PROGRESS NOTES
Assessment & Plan     Hyperlipidemia LDL goal <160  Will refill medications once labs reviewed.  - Lipid panel reflex to direct LDL Fasting; Future  - Comprehensive metabolic panel; Future  - Lipid panel reflex to direct LDL Fasting  - Comprehensive metabolic panel    Screening for prostate cancer    - Prostate Specific Antigen Screen; Future  - Prostate Specific Antigen Screen    Elevated blood sugar  Previously slight elevated numbers blood sugar, will follow up on the labs and see if any issues.  - Hemoglobin A1c; Future  - Hemoglobin A1c      Carol Brown is a 58 year old, presenting for the following health issues:  Lipids (Fasting )      1/18/2024     6:58 AM   Additional Questions   Roomed by Tayo FRIAS     History of Present Illness       Hyperlipidemia:  He presents for follow up of hyperlipidemia.   He is taking medication to lower cholesterol. He is not having myalgia or other side effects to statin medications.    He eats 2-3 servings of fruits and vegetables daily.He consumes 1 sweetened beverage(s) daily.He exercises with enough effort to increase his heart rate 20 to 29 minutes per day.  He exercises with enough effort to increase his heart rate 3 or less days per week.   He is taking medications regularly.         Objective    /70   Pulse 110   Temp 99.3  F (37.4  C) (Tympanic)   Resp 18   Wt 88.9 kg (196 lb)   SpO2 95%   BMI 29.53 kg/m    Body mass index is 29.53 kg/m .    Review of Systems  CONSTITUTIONAL: NEGATIVE for fever, chills, change in weight  ENT/MOUTH: NEGATIVE for ear, mouth and throat problems  RESP: NEGATIVE for significant cough or SOB  CV: NEGATIVE for chest pain, palpitations or peripheral edema  Physical Exam   GENERAL: alert and no distress  NECK: no adenopathy, no asymmetry, masses, or scars  RESP: lungs clear to auscultation - no rales, rhonchi or wheezes  CV: regular rate and rhythm, normal S1 S2, no S3 or S4, no murmur, click or rub, no peripheral  edema  ABDOMEN: soft, nontender, no hepatosplenomegaly, no masses and bowel sounds normal  MS: no gross musculoskeletal defects noted, no edema            Signed Electronically by: Bryn Levin MD

## 2024-01-19 DIAGNOSIS — E78.5 HYPERLIPIDEMIA LDL GOAL <160: ICD-10-CM

## 2024-01-19 LAB
ALBUMIN SERPL BCG-MCNC: 4.5 G/DL (ref 3.5–5.2)
ALP SERPL-CCNC: 65 U/L (ref 40–150)
ALT SERPL W P-5'-P-CCNC: 13 U/L (ref 0–70)
ANION GAP SERPL CALCULATED.3IONS-SCNC: 10 MMOL/L (ref 7–15)
AST SERPL W P-5'-P-CCNC: 18 U/L (ref 0–45)
BILIRUB SERPL-MCNC: 0.4 MG/DL
BUN SERPL-MCNC: 11.9 MG/DL (ref 8–23)
CALCIUM SERPL-MCNC: 9.3 MG/DL (ref 8.8–10.2)
CHLORIDE SERPL-SCNC: 103 MMOL/L (ref 98–107)
CHOLEST SERPL-MCNC: 147 MG/DL
CREAT SERPL-MCNC: 1.07 MG/DL (ref 0.67–1.17)
DEPRECATED HCO3 PLAS-SCNC: 25 MMOL/L (ref 22–29)
EGFRCR SERPLBLD CKD-EPI 2021: 79 ML/MIN/1.73M2
FASTING STATUS PATIENT QL REPORTED: YES
GLUCOSE SERPL-MCNC: 114 MG/DL (ref 70–99)
HDLC SERPL-MCNC: 45 MG/DL
LDLC SERPL CALC-MCNC: 88 MG/DL
NONHDLC SERPL-MCNC: 102 MG/DL
POTASSIUM SERPL-SCNC: 4.4 MMOL/L (ref 3.4–5.3)
PROT SERPL-MCNC: 7.5 G/DL (ref 6.4–8.3)
PSA SERPL DL<=0.01 NG/ML-MCNC: 0.9 NG/ML (ref 0–4.5)
SODIUM SERPL-SCNC: 138 MMOL/L (ref 135–145)
TRIGL SERPL-MCNC: 71 MG/DL

## 2024-01-19 RX ORDER — ATORVASTATIN CALCIUM 20 MG/1
20 TABLET, FILM COATED ORAL DAILY
Qty: 90 TABLET | Refills: 3 | Status: SHIPPED | OUTPATIENT
Start: 2024-01-19

## 2024-01-27 ENCOUNTER — HEALTH MAINTENANCE LETTER (OUTPATIENT)
Age: 59
End: 2024-01-27

## 2024-05-07 ENCOUNTER — OFFICE VISIT (OUTPATIENT)
Dept: FAMILY MEDICINE | Facility: CLINIC | Age: 59
End: 2024-05-07
Payer: COMMERCIAL

## 2024-05-07 VITALS
SYSTOLIC BLOOD PRESSURE: 114 MMHG | WEIGHT: 195.3 LBS | TEMPERATURE: 97.4 F | DIASTOLIC BLOOD PRESSURE: 82 MMHG | BODY MASS INDEX: 29.6 KG/M2 | OXYGEN SATURATION: 97 % | HEART RATE: 81 BPM | RESPIRATION RATE: 18 BRPM | HEIGHT: 68 IN

## 2024-05-07 DIAGNOSIS — J32.9 SINUSITIS, UNSPECIFIED CHRONICITY, UNSPECIFIED LOCATION: ICD-10-CM

## 2024-05-07 DIAGNOSIS — M62.838 NECK MUSCLE SPASM: ICD-10-CM

## 2024-05-07 DIAGNOSIS — Z12.11 SCREEN FOR COLON CANCER: Primary | ICD-10-CM

## 2024-05-07 PROCEDURE — 99213 OFFICE O/P EST LOW 20 MIN: CPT | Performed by: FAMILY MEDICINE

## 2024-05-07 PROCEDURE — G2211 COMPLEX E/M VISIT ADD ON: HCPCS | Performed by: FAMILY MEDICINE

## 2024-05-07 RX ORDER — CYCLOBENZAPRINE HCL 10 MG
10 TABLET ORAL
Qty: 20 TABLET | Refills: 0 | Status: ON HOLD | OUTPATIENT
Start: 2024-05-07 | End: 2024-08-21

## 2024-05-07 RX ORDER — AZITHROMYCIN 250 MG/1
TABLET, FILM COATED ORAL
Qty: 6 TABLET | Refills: 0 | Status: ON HOLD | OUTPATIENT
Start: 2024-05-07 | End: 2024-08-21

## 2024-05-07 RX ORDER — NAPROXEN 500 MG/1
500 TABLET ORAL 2 TIMES DAILY WITH MEALS
Qty: 20 TABLET | Refills: 0 | Status: SHIPPED | OUTPATIENT
Start: 2024-05-07

## 2024-05-07 ASSESSMENT — PAIN SCALES - GENERAL: PAINLEVEL: NO PAIN (1)

## 2024-05-07 NOTE — PROGRESS NOTES
"  Assessment & Plan     Screen for colon cancer    - Colonoscopy Screening  Referral; Future    Neck muscle spasm    - cyclobenzaprine (FLEXERIL) 10 MG tablet; Take 1 tablet (10 mg) by mouth nightly as needed for muscle spasms  - naproxen (NAPROSYN) 500 MG tablet; Take 1 tablet (500 mg) by mouth 2 times daily (with meals)    Sinusitis, unspecified chronicity, unspecified location  Use only  if worsening symptoms.  - azithromycin (ZITHROMAX) 250 MG tablet; Two tablets first day, then one tablet daily for four days.    The longitudinal plan of care for the diagnosis(es)/condition(s) as documented were addressed during this visit. Due to the added complexity in care, I will continue to support Kevin in the subsequent management and with ongoing continuity of care.      BMI  Estimated body mass index is 29.7 kg/m  as calculated from the following:    Height as of this encounter: 1.727 m (5' 8\").    Weight as of this encounter: 88.6 kg (195 lb 4.8 oz).             Subjective 2  Kevin is a 58 year old, presenting for the following health issues:  Sinus Problem (Continuous headaches)  She came today with complaint of sinus tenderness as well as headache neck muscle spasm.  He has been cold outside little cold weather and since then he is complaining of pain tenderness and some neck muscle spasm.  Denies any fever or chills.  Mild discharge noted.      5/7/2024    10:07 AM   Additional Questions   Roomed by Graham GARCIA     History of Present Illness       Headaches:   Since the patient's last clinic visit, headaches are: no change  The patient is getting headaches:  All day  He is able to do normal daily activities when he has a migraine.  The patient is taking the following rescue/relief medications:  Ibuprofen (Advil, Motrin) and Tylenol   Patient states \"I get no relief\" from the rescue/relief medications.   The patient is taking the following medications to prevent migraines:  No medications to prevent " "migraines  In the past 4 weeks, the patient has gone to an Urgent Care or Emergency Room 0 times times due to headaches.    He eats 2-3 servings of fruits and vegetables daily.He consumes 1 sweetened beverage(s) daily.He exercises with enough effort to increase his heart rate 20 to 29 minutes per day.  He exercises with enough effort to increase his heart rate 4 days per week.   He is taking medications regularly.             Review of Systems  Constitutional, HEENT, cardiovascular, pulmonary, gi and gu systems are negative, except as otherwise noted.      Objective    /82   Pulse 81   Temp 97.4  F (36.3  C) (Tympanic)   Resp 18   Ht 1.727 m (5' 8\")   Wt 88.6 kg (195 lb 4.8 oz)   SpO2 97%   BMI 29.70 kg/m    Body mass index is 29.7 kg/m .  Physical Exam   GENERAL: alert and no distress  NECK: no adenopathy, no asymmetry, masses, or scars  Sinus tenderness noted  RESP: lungs clear to auscultation - no rales, rhonchi or wheezes  CV: regular rate and rhythm, normal S1 S2, no S3 or S4, no murmur, click or rub, no peripheral edema  ABDOMEN: soft, nontender, no hepatosplenomegaly, no masses and bowel sounds normal  MS: no gross musculoskeletal defects noted, no edema            Signed Electronically by: Bryn Levin MD    "

## 2024-06-20 ENCOUNTER — TELEPHONE (OUTPATIENT)
Dept: GASTROENTEROLOGY | Facility: CLINIC | Age: 59
End: 2024-06-20
Payer: COMMERCIAL

## 2024-06-20 NOTE — TELEPHONE ENCOUNTER
"Endoscopy Scheduling Screen    Have you had a positive Covid test in the last 14 days?  No    What is your communication preference for Instructions and/or Bowel Prep?   [facility]    What insurance is in the chart?  Other:  [employer]    Ordering/Referring Provider: NICKY LESLIE   (If ordering provider performs procedure, schedule with ordering provider unless otherwise instructed. )    BMI: Estimated body mass index is 29.7 kg/m  as calculated from the following:    Height as of 5/7/24: 1.727 m (5' 8\").    Weight as of 5/7/24: 88.6 kg (195 lb 4.8 oz).     Sedation Ordered  moderate sedation.   If patient BMI > 50 do not schedule in [facility].    If patient BMI > 45 do not schedule at [facility].    Are you taking methadone or Suboxone?  No    Have you had difficulties, pain, or discomfort during past endoscopy procedures?  No    Are you taking any prescription medications for pain 3 or more times per week?   NO, No RN review required.    Do you have a history of malignant hyperthermia?  No    (Females) Are you currently pregnant?   No     Have you been diagnosed or told you have pulmonary hypertension?   No    Do you have an LVAD?  No    Have you been told you have moderate to severe sleep apnea?  No    Have you been told you have COPD, asthma, or any other lung disease?  No    Do you have any heart conditions?  No     Have you ever had or are you waiting for an organ transplant?  No. Continue scheduling, no site restrictions.    Have you had a stroke or transient ischemic attack (TIA aka \"mini stroke\" in the last 6 months?   No    Have you been diagnosed with or been told you have cirrhosis of the liver?   No    Are you currently on dialysis?   No    Do you need assistance transferring?   No    BMI: Estimated body mass index is 29.7 kg/m  as calculated from the following:    Height as of 5/7/24: 1.727 m (5' 8\").    Weight as of 5/7/24: 88.6 kg (195 lb 4.8 oz).     Is patients BMI > 40 and scheduling location " [facility]?  No    Do you take an injectable medication for weight loss or diabetes (excluding insulin)?  No    Do you take the medication Naltrexone?  No    Do you take blood thinners?  No       Prep   Are you currently on dialysis or do you have chronic kidney disease?  No    Do you have a diagnosis of diabetes?  No    Do you have a diagnosis of cystic fibrosis (CF)?  No    On a regular basis do you go 3 -5 days between bowel movements?  No    BMI > 40?  No    Preferred Pharmacy:    [facility] [place], [place] [address] [address]  [place] [address]  Phone: [phone] Fax: [phone]      Final Scheduling Details     Procedure scheduled  Colonoscopy    Surgeon:  BECKIE      Date of procedure:  8/21/24     Pre-OP / PAC:   No - Not required for this site.    Location  [facility] - Per order.    Sedation   Moderate Sedation - Per order.      Patient Reminders:   You will receive a call from a Nurse to review instructions and health history.  This assessment must be completed prior to your procedure.  Failure to complete the Nurse assessment may result in the procedure being cancelled.      On the day of your procedure, please designate an adult(s) who can drive you home stay with you for the next 24 hours. The medicines used in the exam will make you sleepy. You will not be able to drive.      You cannot take public transportation, ride share services, or non-medical taxi service without a responsible caregiver.  Medical transport services are allowed with the requirement that a responsible caregiver will receive you at your destination.  We require that drivers and caregivers are confirmed prior to your procedure.

## 2024-06-20 NOTE — LETTER
6/20/2024      Kevin Dover  [address] [place] [address]          Standard Miralax Bowel Prep   Prep instructions for your colonoscopy   For prep questions, please call: [facility] [address], [place] [address] -  [phone] option 4    Please read these instructions carefully at least 7 days prior to your colonoscopy procedure. Be sure to follow all directions completely. The inside of your colon must be clean to allow for a complete examination for the presence of any growths, polyps, and/or abnormalities, as well as their biopsy or removal. A number of tips are included in order to make this part of the procedure as comfortable as possible.    Getting ready   Purchase the following items over-the-counter/off the shelf at the drug store:    Four (4) - Dulcolax laxative (Bisacodyl) 5mg tablets (Do not use Dulcolax stool softener)   8.3 ounce bottle of Miralax powder (ClearLAX, SmoothLAX, PowderLAX)  64 ounces of Gatorade or similar sports drink. Not red or purple. (Pedialyte, Propel, Gatorade G2/Zero, Powerade, Powerade Zero)   10 ounce bottle of clear Magnesium Citrate    A nurse will call you to go over your appointment details and prep instructions. Not completing the nurse call could result with your appointment being cancelled.    You must arrange for an adult to drive you home after your exam. Your colonoscopy cannot be done unless you have a ride. If you need to use public transportation, someone must ride with you and stay with you for up to 24 hours.       7 days before procedure     Consult with your prescribing provider about stopping any:     Diabetic/Weight Loss Injectable Medication GLP-1 agonist (such as Exenatide (Byetta, Bydureon),  Mounjaro (Tirzepatide). Ozempic (Semaglutide). Semaglutide. Symlin (Pamlintide), Tanzeum (Albiglutide). Tirzepatide-Weight Management (Zepbound), Trulicity (Dulaglutide), Victoza (Saxenda, Liraglutide), Wegovy (Semaglutide) please  follow below guidelines for holding:    For weekly injection HOLD 7 days before procedure.  For once or twice a day injection HOLD the day before procedure and day of procedure.  For oral, daily dosing (Rybelsus) HOLD 7 days before procedure.    Blood thinning and/or anti platelet medications: such as Coumadin, Plavix, Xarelto, Eliquis, Lovenox or others, these medications may need to be stopped temporarily before your procedure.     If you take insulin for diabetes, ask your prescribing provider for instructions on how to take this medication while preparing for a colonoscopy.     NSAIDs medications such as Sulindac, Celebrex, Mobic, Relafen or others may need to be stopped before the procedure. This will be discussed during nurse review call, or you can reach out to your prescribing provider.      Stop taking iron (ferrous sulfate), multivitamins that contain iron, and/or fiber supplements (Metamucil, Benefiber, Psyllium husk powder, Fibercon, etc.).      Stop eating whole kernel corn, popcorn, nuts, and foods that contain seeds. These can stay in the colon for many days, and they can clog up the colonoscope.       3 days before procedure     Begin a low-fiber diet (see examples below). No Olestra (a fat substitute).    Consume no more than 10-15 grams of fiber each day.     It is important to stay hydrated. Drink at least eight 8-ounce glasses of water a day.      LOW FIBER DIET   You can have:   Do not have:    Starches: White bread, rolls, biscuits, croissants, [place] toast, white flour tortillas, waffles, pancakes, [place]ian toast; white rice, noodles, pasta, macaroni; cooked and peeled potatoes; plain crackers, saltines; cooked farina or cream of rice; puffed rice, corn flakes, Rice Krispies, Special K      Vegetables: tender cooked and canned, vegetable broths     Fruits and fruit juices: Strained fruit juice, canned fruit without seeds or skin (not pineapple), applesauce, pear sauce, ripe bananas, melons (not  watermelon)     Milk products: Milk (plain or flavored), cheese, cottage cheese, yogurt (no berries), custard, ice cream       Proteins: Tender, well-cooked ground beef, lamb, veal, ham, pork, chicken, turkey, fish or organ meat, Tofu, eggs, creamy peanut butter      Fats and condiments:  Margarine, butter, oils, mayonnaise, sour cream, salad dressing, plain gravy; spices, cooked herbs; sugar, clear jelly, honey, syrup      Snacks, sweets and drinks: Pretzels, hard candy; plain cakes and cookies (no nuts or seeds); gelatin, plain pudding, sherbet, Popsicles; coffee, tea, carbonated ( fizzy ) drinks  Starches: Breads or rolls that contain nuts, seeds or fruit; whole wheat or whole grain breads that contain more than 2 grams of fiber per serving; cornbread; corn or whole wheat tortillas; potatoes with skin; brown rice, wild rice, quinoa, kasha (buckwheat), and oatmeal      Vegetables: Any raw or steamed vegetables; vegetables with seeds; corn in any form      Fruits and fruit juices: Prunes, prune juice, raisins and other dried fruits, berries and other fruits with seeds, canned pineapple juices with pulp such as orange, grapefruit, pineapple or tomato juice     Milk products: Any yogurt with nuts, seeds or berries      Proteins: Tough, fibrous meats with gristle; cooked dried beans, peas or lentils; crunchy peanut butter     Fats and condiments: Pickles, olives, relish, horseradish; jam, marmalade, preserves      Snacks, sweets and drinks: Popcorn, nuts, seeds, granola, coconut, candies made with nuts or seeds; all desserts that contain nuts, seeds, raisins and other dried fruits, coconut, whole grains or bran.       1 day before procedure       Start a clear liquid diet (see examples below). Do not eat any solid food.      Drink at least eight to ten 8-ounce glasses of water throughout the day. ? ? ? ? ? ? ? ?    Stop taking NSAIDs pain relievers, such as Advil, Ibuprofen, Motrin, etc. You may take  Tylenol.      CLEAR LIQUID DIET:  You can have: Do not have:    Water, tea, coffee (no milk or cream)   Soda pop, Gatorade (not red or purple)   Coconut water   Jell-O, Popsicles (no milk or fruit pieces - not red or purple)   Fat-free soup broth or bouillon   Plain hard candy, such as clear life savers (not red or purple)   Clear juices and fruit-flavored drinks, such as apple juice, white grape juice, Hi-C, and Reji-Aid (not red or purple)  Milk or milk products such as ice cream, malts or shakes, or coffee creamer   Red or purple drinks of any kind such as cranberry juice, grape juice or Reji-Aid. Avoid red or purple Jell-O, Popsicles, sorbet, sherbet and candy   Juices with pulp such as orange, grapefruit, pineapple or tomato juice   Cream soups of any kind   Alcohol and beer   Protein drinks or protein powder     Step 1     At 4 PM, take 2 Dulcolax (Bisacodyl) tablets.   At 5 PM, mix the entire bottle of Miralax with 64 ounces of Gatorade in a pitcher and stir to dissolve the powder. Start drinking one 8-ounce glass of the Miralax and Gatorade mixture every 15 minutes until the pitcher is HALF empty (about 4 glasses).  Drink each glass quickly. Store the rest in the refrigerator.   Continue to drink clear liquids.    Step 2     At 10 PM, take 2 Dulcolax (Bisacodyl) tablets  At 10 PM start drinking the remainder of the Miralax and Gatorade mixture. Drink one 8-ounce glass of Miralax and Gatorade mixture every 15 minutes until the pitcher is empty (about 4 glasses). Drink each glass quickly.     Step 3     If you arrive for your procedure BEFORE 11 AM:  6 hours prior to your scheduled arrival to the endoscopy unit, drink 10 ounces of clear Magnesium Citrate.    If you arrive for your procedure AFTER 11 AM:  At 6 AM on the day of the exam drink 10 ounces of clear Magnesium Citrate.       Reminders While Drinking Laxatives:     After you start drinking the solution, stay near a toilet. You may have watery stools  (diarrhea), mild cramping, bloating, and nausea. You may want to use Vaseline on the skin around your anus after each bowel movement or use wet wipes to prevent irritation. Bowel movements will be liquid and dark in color at first and then should turn clear yellow in color.      Some find it easier to drink the Miralax and Gatorade mixture when it is chilled. Do not add ice as this will dilute the laxative. Drinking from a straw can be helpful to drink the liquid faster.     If you have nausea or vomiting during drinking the solution, rinse your mouth with water and take a 15-30 minute break and then continue drinking solution.       Day of procedure     2 hours before your arrival time stop drinking all liquids, including water.   Do not smoke or swallow anything, including water or gum for at least 2 hours before your arrival time. This is a safety issue. Your procedure could be cancelled if you do not follow directions.  No chewing tobacco 6 hours prior to procedure arrival time.     You may take your necessary morning medications with sips of water (4 ounces).   Do not take diabetes medicine by mouth until after your exam.  If you have asthma, bring your inhalers.  Please perform your nebulizer treatments and airway clearance therapy in the morning prior to the procedure (if applicable).    Arrive with a responsible adult who can drive you home and stay with you for up to 24 hours. The medications used during the procedure will make you sleepy, so you won't be able to drive yourself home.   You cannot use public transportation, ride-share services, or non-medical taxi services without a responsible caregiver. Medical transport services are okay, but a caregiver must be there to receive you at your destination.  Please check in with your  when you arrive. Drivers should stay on campus.    Expect to be at the procedure center for about 1.5-2.5 hours.    Do not wear jewelry (i.e. earrings, rings, necklaces,  watches, etc.). Leave your purse, billfold, credit cards, and other valuables at home.      Bring insurance card and ID.       Answers to Commonly Asked Questions     How soon can I eat after the procedure?  You may resume your normal diet when you feel ready, unless advised otherwise by the doctor performing your procedure. We recommend starting with a light meal.   Do not drink alcohol for 24 hours after your procedure.  You may resume normal activities (work, exercise, etc.) after 24 hours.    How might I feel after the procedure?  It is normal to feel bloated and gassy after your procedure. Walking will help move the air through your colon. You can take non-aspirin pain relievers that contain acetaminophen (Tylenol).  If you are having sedation, we require a responsible adult to take you home for your safety. The sedation medicines used to relax you during the procedure can impair your judgement and reaction time, and make you forgetful and possibly a little unsteady.  Do not drive, make any important decisions, or sign any legal documents for 24 hours after your procedure.    When will I get my test results?  You should have your procedure results and any lab results (if applicable) by letter, [employer]hart message, or phone call within 2 weeks. If you have any questions, please call the doctor that referred you for the procedure.    How do I know if my colon is cleaned out?   After completing the bowel prep, your bowel movements should be all liquid and yellow. Your bowel movements will look similar to urine in the toilet. If there are pieces of stool (poop) in the toilet, or if you can't see to the bottom of the toilet, please call our office for advice. Call [phone] and ask to speak with a nurse.    Why is the Miralax bowel prep taken in several steps?   The stool is flushed out by a large wave of fluid going through the colon. Just sipping a large volume of the solution will not achieve the desired result.  Studies have shown that two smaller waves (or more in some cases) are better than one large one.      Why do I need to drink the magnesium citrate so close to the procedure arrival time?   The intestine continues to produce mucus and waste. Longer intervals between the prep and the exam can lead to less than desired results. However, the stomach must be empty at the time of the exam in order to allow safe sedation. Therefore, there should be nothing by mouth 2 hours before the exam is started.    What if I need to cancel or reschedule my procedure?  Contact our endoscopy scheduling team at [phone], option 2. Monday through Friday, 7:00am-5:00pm.

## 2024-08-01 NOTE — TELEPHONE ENCOUNTER
Standard Miralax Bowel Prep recommended due to standard bowel prep. Instructions were sent via letter.

## 2024-08-13 ENCOUNTER — TELEPHONE (OUTPATIENT)
Dept: GASTROENTEROLOGY | Facility: CLINIC | Age: 59
End: 2024-08-13
Payer: COMMERCIAL

## 2024-08-13 NOTE — TELEPHONE ENCOUNTER
Pre assessment completed for upcoming procedure.   (Please see previous telephone encounter notes for complete details)        Procedure details:    Arrival time and facility location reviewed.    Pre op exam needed? No.    Designated  policy reviewed. Instructed to have someone stay 6  hours post procedure.       Medication review:    Medications reviewed. Please see supporting documentation below. Holding recommendations discussed (if applicable).   NSAID medication(s): Ibuprofen (Advil, Motrin): HOLD 1 day before procedure.  Naproxen (Aleve, Naprosyn): HOLD 4 days before procedure.       Prep for procedure:     Procedure prep instructions reviewed.        Any additional information needed:  N/A      Patient  verbalized understanding and had no questions or concerns at this time.      Cristina Paredes RN  Endoscopy Procedure Pre Assessment   [phone] option 4

## 2024-08-13 NOTE — TELEPHONE ENCOUNTER
Pre visit planning completed.      Procedure details:    Patient scheduled for Colonoscopy on 08.21.2024.     Arrival time: 0715. Procedure time 0800    Facility location: [facility]; [address] [place] [address]. Check in location: Main entrance, door #1 on the North side of the building under roundabout awning. DO NOT GO TO SURGERY/ED ENTRANCE.     Sedation type: Conscious sedation     Pre op exam needed? No.    Indication for procedure: Screen for colon cancer       Chart review:     Electronic implanted devices? No    Recent diagnosis of diverticulitis within the last 6 weeks? No      Medication review:    Diabetic? No    Anticoagulants? No    Weight loss medication/injectable? No GLP 1 medications per patient's medication list.  RN will verify with pre-assessment call.    NSAIDS? Yes.  Ibuprofen (Advil, Motrin).  Holding interval of 1 day before procedure. and Naproxen (Naprosyn, Aleve).  Holding interval of 4 days.    Other medication HOLDING recommendations:  N/A      Prep for procedure:     Bowel prep recommendation: Standard Miralax  Due to: standard bowel prep.    Prep instructions sent via [employer] [facility] team also sent letter        Cristina Paredes RN  Endoscopy Procedure Pre Assessment RN  [phone] option 4

## 2024-08-21 ENCOUNTER — HOSPITAL ENCOUNTER (OUTPATIENT)
Facility: CLINIC | Age: 59
Discharge: HOME | End: 2024-08-21
Attending: COLON & RECTAL SURGERY | Admitting: COLON & RECTAL SURGERY
Payer: COMMERCIAL

## 2024-08-21 VITALS
HEIGHT: 68 IN | RESPIRATION RATE: 16 BRPM | SYSTOLIC BLOOD PRESSURE: 118 MMHG | OXYGEN SATURATION: 97 % | BODY MASS INDEX: 29.55 KG/M2 | DIASTOLIC BLOOD PRESSURE: 89 MMHG | HEART RATE: 83 BPM | WEIGHT: 195 LBS

## 2024-08-21 LAB — COLONOSCOPY: NORMAL

## 2024-08-21 PROCEDURE — G0500 MOD SEDAT ENDO SERVICE >5YRS: HCPCS | Performed by: COLON & RECTAL SURGERY

## 2024-08-21 PROCEDURE — 258N000003 HC RX IP 258 OP 636: Performed by: COLON & RECTAL SURGERY

## 2024-08-21 PROCEDURE — 88305 TISSUE EXAM BY PATHOLOGIST: CPT | Mod: 26 | Performed by: PATHOLOGY

## 2024-08-21 PROCEDURE — 88305 TISSUE EXAM BY PATHOLOGIST: CPT | Mod: TC | Performed by: COLON & RECTAL SURGERY

## 2024-08-21 PROCEDURE — 45385 COLONOSCOPY W/LESION REMOVAL: CPT | Mod: PT | Performed by: COLON & RECTAL SURGERY

## 2024-08-21 PROCEDURE — 250N000011 HC RX IP 250 OP 636: Performed by: COLON & RECTAL SURGERY

## 2024-08-21 RX ORDER — NALOXONE HYDROCHLORIDE 0.4 MG/ML
0.2 INJECTION, SOLUTION INTRAMUSCULAR; INTRAVENOUS; SUBCUTANEOUS
Status: DISCONTINUED | OUTPATIENT
Start: 2024-08-21 | End: 2024-08-21 | Stop reason: HOSPADM

## 2024-08-21 RX ORDER — LIDOCAINE 40 MG/G
CREAM TOPICAL
Status: DISCONTINUED | OUTPATIENT
Start: 2024-08-21 | End: 2024-08-21 | Stop reason: HOSPADM

## 2024-08-21 RX ORDER — EPINEPHRINE 1 MG/ML
0.1 INJECTION, SOLUTION INTRAMUSCULAR; SUBCUTANEOUS
Status: DISCONTINUED | OUTPATIENT
Start: 2024-08-21 | End: 2024-08-21 | Stop reason: HOSPADM

## 2024-08-21 RX ORDER — ONDANSETRON 4 MG/1
4 TABLET, ORALLY DISINTEGRATING ORAL EVERY 6 HOURS PRN
Status: DISCONTINUED | OUTPATIENT
Start: 2024-08-21 | End: 2024-08-21 | Stop reason: HOSPADM

## 2024-08-21 RX ORDER — FLUMAZENIL 0.1 MG/ML
0.2 INJECTION, SOLUTION INTRAVENOUS
Status: DISCONTINUED | OUTPATIENT
Start: 2024-08-21 | End: 2024-08-21 | Stop reason: HOSPADM

## 2024-08-21 RX ORDER — DIPHENHYDRAMINE HYDROCHLORIDE 50 MG/ML
25-50 INJECTION INTRAMUSCULAR; INTRAVENOUS
Status: DISCONTINUED | OUTPATIENT
Start: 2024-08-21 | End: 2024-08-21 | Stop reason: HOSPADM

## 2024-08-21 RX ORDER — ONDANSETRON 2 MG/ML
4 INJECTION INTRAMUSCULAR; INTRAVENOUS EVERY 6 HOURS PRN
Status: DISCONTINUED | OUTPATIENT
Start: 2024-08-21 | End: 2024-08-21 | Stop reason: HOSPADM

## 2024-08-21 RX ORDER — NALOXONE HYDROCHLORIDE 0.4 MG/ML
0.4 INJECTION, SOLUTION INTRAMUSCULAR; INTRAVENOUS; SUBCUTANEOUS
Status: DISCONTINUED | OUTPATIENT
Start: 2024-08-21 | End: 2024-08-21 | Stop reason: HOSPADM

## 2024-08-21 RX ORDER — ONDANSETRON 2 MG/ML
4 INJECTION INTRAMUSCULAR; INTRAVENOUS
Status: DISCONTINUED | OUTPATIENT
Start: 2024-08-21 | End: 2024-08-21 | Stop reason: HOSPADM

## 2024-08-21 RX ORDER — FENTANYL CITRATE 50 UG/ML
50-100 INJECTION, SOLUTION INTRAMUSCULAR; INTRAVENOUS EVERY 5 MIN PRN
Status: DISCONTINUED | OUTPATIENT
Start: 2024-08-21 | End: 2024-08-21 | Stop reason: HOSPADM

## 2024-08-21 RX ORDER — PROCHLORPERAZINE MALEATE 10 MG
10 TABLET ORAL EVERY 6 HOURS PRN
Status: DISCONTINUED | OUTPATIENT
Start: 2024-08-21 | End: 2024-08-21 | Stop reason: HOSPADM

## 2024-08-21 RX ORDER — ATROPINE SULFATE 0.1 MG/ML
1 INJECTION INTRAVENOUS
Status: DISCONTINUED | OUTPATIENT
Start: 2024-08-21 | End: 2024-08-21 | Stop reason: HOSPADM

## 2024-08-21 RX ORDER — SIMETHICONE 40MG/0.6ML
133 SUSPENSION, DROPS(FINAL DOSAGE FORM)(ML) ORAL
Status: DISCONTINUED | OUTPATIENT
Start: 2024-08-21 | End: 2024-08-21 | Stop reason: HOSPADM

## 2024-08-21 RX ADMIN — FENTANYL CITRATE 25 MCG: 0.05 INJECTION, SOLUTION INTRAMUSCULAR; INTRAVENOUS at 08:14

## 2024-08-21 RX ADMIN — FENTANYL CITRATE 100 MCG: 0.05 INJECTION, SOLUTION INTRAMUSCULAR; INTRAVENOUS at 08:07

## 2024-08-21 RX ADMIN — MIDAZOLAM 2 MG: 1 INJECTION INTRAMUSCULAR; INTRAVENOUS at 08:07

## 2024-08-21 RX ADMIN — MIDAZOLAM 1 MG: 1 INJECTION INTRAMUSCULAR; INTRAVENOUS at 08:14

## 2024-08-21 RX ADMIN — SODIUM CHLORIDE 500 ML: 9 INJECTION, SOLUTION INTRAVENOUS at 07:48

## 2024-08-21 ASSESSMENT — ACTIVITIES OF DAILY LIVING (ADL)
ADLS_ACUITY_SCORE: 35

## 2024-08-21 NOTE — H&P
Pre-Endoscopy History and Physical     Kevin Dover MRN# [id number]   YOB: 1965 Age: 59 year old     Date of Procedure: 8/21/2024  Primary care provider: Bryn Levin  Type of Endoscopy: colonoscopy  Reason for Procedure: screening  Type of Anesthesia Anticipated: Moderate Sedation    HPI:    Kevin is a 59 year old male who will be undergoing the above procedure.      A history and physical has been performed. The patient's medications and allergies have been reviewed. The risks and benefits of the procedure and the sedation options and risks were discussed with the patient.  All questions were answered and informed consent was obtained.      He denies a personal or family history of anesthesia complications or bleeding disorders.     No Known Allergies     Prior to Admission Medications   Prescriptions Last Dose Informant Patient Reported? Taking?   atorvastatin (LIPITOR) 20 MG tablet 8/20/2024  No Yes   Sig: Take 1 tablet (20 mg) by mouth daily   naproxen (NAPROSYN) 500 MG tablet   No No   Sig: Take 1 tablet (500 mg) by mouth 2 times daily (with meals)      Facility-Administered Medications: None       Patient Active Problem List   Diagnosis    CARDIOVASCULAR SCREENING; LDL GOAL LESS THAN 160    Seasonal allergic rhinitis    Ventral hernia    Arthritis of right knee    Baker's cyst of knee, right    Osteoma of external auditory canal    Tinnitus, bilateral        Past Medical History:   Diagnosis Date    Conductive hearing loss     Hearing problem     NO ACTIVE PROBLEMS     Tinnitus         Past Surgical History:   Procedure Laterality Date    HERNIORRHAPHY VENTRAL N/A 3/2/2015    Procedure: HERNIORRHAPHY VENTRAL;  Surgeon: Flavio Nowak MD;  Location: [facility] OR    RHINOPLASTY      [facility] NONSPECIFIC PROCEDURE      varicocoel       Social History     Tobacco Use    Smoking status: Former     Current packs/day: 0.00     Average packs/day: 1 pack/day for 20.0 years (20.0 ttl pk-yrs)     Types:  "Cigarettes     Start date: 1984     Quit date: 2004     Years since quittin.9    Smokeless tobacco: Never   Substance Use Topics    Alcohol use: No     Alcohol/week: 0.0 standard drinks of alcohol       Family History   Problem Relation Age of Onset    C.A.D. Mother     Cerebrovascular Disease Mother     Cancer Father         lung CA    Cancer - colorectal Father     Breast Cancer Sister     Diabetes No family hx of     Hypertension No family hx of     Colon Cancer No family hx of        REVIEW OF SYSTEMS:     5 point ROS negative except as noted above in HPI, including Gen., Resp., CV, GI &  system review.      PHYSICAL EXAM:   /83   Pulse 80   Resp 14   Ht 1.727 m (5' 8\")   Wt 88.5 kg (195 lb)   SpO2 99%   BMI 29.65 kg/m   Estimated body mass index is 29.65 kg/m  as calculated from the following:    Height as of this encounter: 1.727 m (5' 8\").    Weight as of this encounter: 88.5 kg (195 lb).   GENERAL APPEARANCE: healthy and alert  MENTAL STATUS: alert  AIRWAY EXAM: Mallampatti Class II (visualization of the soft palate, fauces, and uvula)  RESP: lungs clear to auscultation - no rales, rhonchi or wheezes  CV: regular rates and rhythm      DIAGNOSTICS:    Not indicated      IMPRESSION   ASA Class 2 - Mild systemic disease        PLAN:       Plan for colonoscopy. We discussed the risks, benefits and alternatives and the patient wished to proceed.    The above has been forwarded to the consulting provider.      Signed Electronically by: Ellie Zafar MD  2024    "

## 2024-08-22 LAB
PATH REPORT.COMMENTS IMP SPEC: NORMAL
PATH REPORT.COMMENTS IMP SPEC: NORMAL
PATH REPORT.FINAL DX SPEC: NORMAL
PATH REPORT.GROSS SPEC: NORMAL
PATH REPORT.MICROSCOPIC SPEC OTHER STN: NORMAL
PATH REPORT.RELEVANT HX SPEC: NORMAL
PHOTO IMAGE: NORMAL

## 2024-09-05 ENCOUNTER — PATIENT OUTREACH (OUTPATIENT)
Dept: GASTROENTEROLOGY | Facility: CLINIC | Age: 59
End: 2024-09-05
Payer: COMMERCIAL

## 2024-12-17 ENCOUNTER — OFFICE VISIT (OUTPATIENT)
Dept: FAMILY MEDICINE | Facility: CLINIC | Age: 59
End: 2024-12-17
Payer: COMMERCIAL

## 2024-12-17 VITALS
SYSTOLIC BLOOD PRESSURE: 139 MMHG | BODY MASS INDEX: 30.83 KG/M2 | HEART RATE: 76 BPM | HEIGHT: 68 IN | OXYGEN SATURATION: 98 % | TEMPERATURE: 97 F | WEIGHT: 203.4 LBS | RESPIRATION RATE: 14 BRPM | DIASTOLIC BLOOD PRESSURE: 80 MMHG

## 2024-12-17 DIAGNOSIS — M53.3 COCCYDYNIA: ICD-10-CM

## 2024-12-17 DIAGNOSIS — M62.838 NECK MUSCLE SPASM: ICD-10-CM

## 2024-12-17 DIAGNOSIS — M50.30 DDD (DEGENERATIVE DISC DISEASE), CERVICAL: ICD-10-CM

## 2024-12-17 DIAGNOSIS — Z00.00 ROUTINE GENERAL MEDICAL EXAMINATION AT A HEALTH CARE FACILITY: Primary | ICD-10-CM

## 2024-12-17 DIAGNOSIS — J32.9 SINUSITIS, UNSPECIFIED CHRONICITY, UNSPECIFIED LOCATION: ICD-10-CM

## 2024-12-17 DIAGNOSIS — R03.0 ELEVATED BLOOD PRESSURE READING WITHOUT DIAGNOSIS OF HYPERTENSION: ICD-10-CM

## 2024-12-17 DIAGNOSIS — E78.5 DYSLIPIDEMIA: ICD-10-CM

## 2024-12-17 DIAGNOSIS — R73.03 PREDIABETES: ICD-10-CM

## 2024-12-17 DIAGNOSIS — Z12.5 ENCOUNTER FOR PROSTATE CANCER SCREENING: ICD-10-CM

## 2024-12-17 LAB
ALBUMIN SERPL BCG-MCNC: 4.4 G/DL (ref 3.5–5.2)
ALP SERPL-CCNC: 71 U/L (ref 40–150)
ALT SERPL W P-5'-P-CCNC: 16 U/L (ref 0–70)
ANION GAP SERPL CALCULATED.3IONS-SCNC: 12 MMOL/L (ref 7–15)
AST SERPL W P-5'-P-CCNC: 19 U/L (ref 0–45)
BILIRUB SERPL-MCNC: 0.5 MG/DL
BUN SERPL-MCNC: 14.3 MG/DL (ref 8–23)
CALCIUM SERPL-MCNC: 9.5 MG/DL (ref 8.8–10.4)
CHLORIDE SERPL-SCNC: 105 MMOL/L (ref 98–107)
CHOLEST SERPL-MCNC: 160 MG/DL
CREAT SERPL-MCNC: 1.03 MG/DL (ref 0.67–1.17)
EGFRCR SERPLBLD CKD-EPI 2021: 83 ML/MIN/1.73M2
ERYTHROCYTE [DISTWIDTH] IN BLOOD BY AUTOMATED COUNT: 12.8 % (ref 10–15)
EST. AVERAGE GLUCOSE BLD GHB EST-MCNC: 126 MG/DL
FASTING STATUS PATIENT QL REPORTED: ABNORMAL
FASTING STATUS PATIENT QL REPORTED: NORMAL
GLUCOSE SERPL-MCNC: 117 MG/DL (ref 70–99)
HBA1C MFR BLD: 6 % (ref 0–5.6)
HCO3 SERPL-SCNC: 23 MMOL/L (ref 22–29)
HCT VFR BLD AUTO: 43.8 % (ref 40–53)
HDLC SERPL-MCNC: 55 MG/DL
HGB BLD-MCNC: 14.5 G/DL (ref 13.3–17.7)
LDLC SERPL CALC-MCNC: 94 MG/DL
MCH RBC QN AUTO: 29.7 PG (ref 26.5–33)
MCHC RBC AUTO-ENTMCNC: 33.1 G/DL (ref 31.5–36.5)
MCV RBC AUTO: 90 FL (ref 78–100)
NONHDLC SERPL-MCNC: 105 MG/DL
PLATELET # BLD AUTO: 226 10E3/UL (ref 150–450)
POTASSIUM SERPL-SCNC: 4.4 MMOL/L (ref 3.4–5.3)
PROT SERPL-MCNC: 7 G/DL (ref 6.4–8.3)
PSA SERPL DL<=0.01 NG/ML-MCNC: 0.85 NG/ML (ref 0–4.5)
RBC # BLD AUTO: 4.88 10E6/UL (ref 4.4–5.9)
SODIUM SERPL-SCNC: 140 MMOL/L (ref 135–145)
TRIGL SERPL-MCNC: 54 MG/DL
WBC # BLD AUTO: 6.4 10E3/UL (ref 4–11)

## 2024-12-17 PROCEDURE — 80061 LIPID PANEL: CPT | Performed by: INTERNAL MEDICINE

## 2024-12-17 PROCEDURE — 99214 OFFICE O/P EST MOD 30 MIN: CPT | Mod: 25 | Performed by: INTERNAL MEDICINE

## 2024-12-17 PROCEDURE — G0103 PSA SCREENING: HCPCS | Performed by: INTERNAL MEDICINE

## 2024-12-17 PROCEDURE — 85027 COMPLETE CBC AUTOMATED: CPT | Performed by: INTERNAL MEDICINE

## 2024-12-17 PROCEDURE — 36415 COLL VENOUS BLD VENIPUNCTURE: CPT | Performed by: INTERNAL MEDICINE

## 2024-12-17 PROCEDURE — 80053 COMPREHEN METABOLIC PANEL: CPT | Performed by: INTERNAL MEDICINE

## 2024-12-17 PROCEDURE — 83036 HEMOGLOBIN GLYCOSYLATED A1C: CPT | Performed by: INTERNAL MEDICINE

## 2024-12-17 PROCEDURE — 99396 PREV VISIT EST AGE 40-64: CPT | Performed by: INTERNAL MEDICINE

## 2024-12-17 RX ORDER — MELOXICAM 15 MG/1
15 TABLET ORAL DAILY PRN
Qty: 30 TABLET | Refills: 1 | Status: SHIPPED | OUTPATIENT
Start: 2024-12-17

## 2024-12-17 RX ORDER — ATORVASTATIN CALCIUM 20 MG/1
20 TABLET, FILM COATED ORAL DAILY
Qty: 90 TABLET | Refills: 3 | Status: SHIPPED | OUTPATIENT
Start: 2024-12-17

## 2024-12-17 ASSESSMENT — PAIN SCALES - GENERAL: PAINLEVEL_OUTOF10: MODERATE PAIN (5)

## 2024-12-17 NOTE — PROGRESS NOTES
Preventive Care Visit  [facility] HERMELINDO Marin MD, Internal Medicine  Dec 17, 2024        Assessment and Plan  1. Routine general medical examination at a health care facility (Primary)    Pt is new to me, last seen our group in 5/2024. He is here for acute concerns and Opting to change this to annual physical.     Does have HLD on Lipitor and chronic neck issues with intermittent flareups.   Last labs in 1/2024 with prediabetes . Will recheck all labs.     - Hemoglobin A1c; Future  - Comprehensive metabolic panel (BMP + Alb, Alk Phos, ALT, AST, Total. Bili, TP); Future  - CBC with platelets; Future  - PSA, screen; Future  - Lipid panel reflex to direct LDL Fasting; Future  - atorvastatin (LIPITOR) 20 MG tablet; Take 1 tablet (20 mg) by mouth daily.  Dispense: 90 tablet; Refill: 3  - PRIMARY CARE FOLLOW-UP SCHEDULING; Future    2. Coccydynia  New problem, which pt endorses that this Started couple of months back with possible heavy lifting and physical stress and strain. Pt states its mostly 5-6/10 severity and has to avoid pressure on it.   Physical exam positive for tenderness on palpation of tip of Coccyx bone, no other anal concerns.   - Differential diagnosis of Coccydynia versus any occult coccyx bone fractures cannot be excluded.   Shared decision to check for X ray before considering pelvic therapy cor 4 weeks and further recommendations if no improvement with 4 weeks follow up.   - Will give Symptomatic Meloxicam as needed for improvement.   - Comprehensive metabolic panel (BMP + Alb, Alk Phos, ALT, AST, Total. Bili, TP); Future  - meloxicam (MOBIC) 15 MG tablet; Take 1 tablet (15 mg) by mouth daily as needed for moderate pain or pain.  Dispense: 30 tablet; Refill: 1  - XR Sacrum and Coccyx 2 Views; Future    3. Neck muscle spasm  4. DDD (degenerative disc disease), cervical  Chronic problem, intermittent flareups. Last X ray C-spine in 2008 showing DDD C5-C6. Physical exam  positive for mild Occipitalis and trapezius spasm bilaterally. Will consider Zanaflex and physical therapy for improvement.   - tiZANidine (ZANAFLEX) 4 MG tablet; Take 1 tablet (4 mg) by mouth nightly as needed for muscle spasms.  Dispense: 30 tablet; Refill: 1    5. Dyslipidemia  - Lipid panel reflex to direct LDL Fasting; Future  - atorvastatin (LIPITOR) 20 MG tablet; Take 1 tablet (20 mg) by mouth daily.  Dispense: 90 tablet; Refill: 3    6. Prediabetes  - Hemoglobin A1c; Future    7. Sinusitis, unspecified chronicity, unspecified location  - CBC with platelets; Future    8. Encounter for prostate cancer screening  - PSA, screen; Future    9. Elevated blood pressure reading without diagnosis of hypertension  Borderline elevated in the past as well. Pt doesn't have BP machine and doesn't check at home. Emphasized to check BP 2x/week and let us know if above goal which he understood and agreed.   - Comprehensive metabolic panel (BMP + Alb, Alk Phos, ALT, AST, Total. Bili, TP); Future         Please note that this note consists of symbols derived from keyboarding, dictation and/or voice recognition software. As a result, there may be errors in the script that have gone undetected. Please consider this when interpreting information found in this chart.    Patient Instructions   As discussed, please do fasting labs placed    Will consider Coccyx X ray and do further recommendations.     Symptomatic medications sent for your Coccyx pain , as well as neck muscle spasm with drowsiness as side effect.     Refills will be taken care on cholesterol medication .     ===============================================  Patient Education  Preventive Care Advice   This is general advice given by our system to help you stay healthy. However, your care team may have specific advice just for you. Please talk to your care team about your preventive care needs.  Nutrition  Eat 5 or more servings of fruits and vegetables each day.  Try  wheat bread, brown rice and whole grain pasta (instead of white bread, rice, and pasta).  Get enough calcium and vitamin D. Check the label on foods and aim for 100% of the RDA (recommended daily allowance).  Lifestyle  Exercise at least 150 minutes each week  (30 minutes a day, 5 days a week).  Do muscle strengthening activities 2 days a week. These help control your weight and prevent disease.  No smoking.  Wear sunscreen to prevent skin cancer.  Have a dental exam and cleaning every 6 months.  Yearly exams  See your health care team every year to talk about:  Any changes in your health.  Any medicines your care team has prescribed.  Preventive care, family planning, and ways to prevent chronic diseases.  Shots (vaccines)   HPV shots (up to age 26), if you've never had them before.  Hepatitis B shots (up to age 59), if you've never had them before.  COVID-19 shot: Get this shot when it's due.  Flu shot: Get a flu shot every year.  Tetanus shot: Get a tetanus shot every 10 years.  Pneumococcal, hepatitis A, and RSV shots: Ask your care team if you need these based on your risk.  Shingles shot (for age 50 and up)  General health tests  Diabetes screening:  Starting at age 35, Get screened for diabetes at least every 3 years.  If you are younger than age 35, ask your care team if you should be screened for diabetes.  Cholesterol test: At age 39, start having a cholesterol test every 5 years, or more often if advised.  Bone density scan (DEXA): At age 50, ask your care team if you should have this scan for osteoporosis (brittle bones).  Hepatitis C: Get tested at least once in your life.  STIs (sexually transmitted infections)  Before age 24: Ask your care team if you should be screened for STIs.  After age 24: Get screened for STIs if you're at risk. You are at risk for STIs (including HIV) if:  You are sexually active with more than one person.  You don't use condoms every time.  You or a partner was diagnosed with  a sexually transmitted infection.  If you are at risk for HIV, ask about PrEP medicine to prevent HIV.  Get tested for HIV at least once in your life, whether you are at risk for HIV or not.  Cancer screening tests  Cervical cancer screening: If you have a cervix, begin getting regular cervical cancer screening tests starting at age 21.  Breast cancer scan (mammogram): If you've ever had breasts, begin having regular mammograms starting at age 40. This is a scan to check for breast cancer.  Colon cancer screening: It is important to start screening for colon cancer at age 45.  Have a colonoscopy test every 10 years (or more often if you're at risk) Or, ask your provider about stool tests like a FIT test every year or Cologuard test every 3 years.  To learn more about your testing options, visit:   .  For help making a decision, visit:   https://bit.ly/hl04662.  Prostate cancer screening test: If you have a prostate, ask your care team if a prostate cancer screening test (PSA) at age 55 is right for you.  Lung cancer screening: If you are a current or former smoker ages 50 to 80, ask your care team if ongoing lung cancer screenings are right for you.  For informational purposes only. Not to replace the advice of your health care provider. Copyright   2023 [facility]. All rights reserved. Clinically reviewed by the [facility] Transitions Program. [employer] [id number] - REV 01/24.     Return in about 4 weeks (around 1/14/2025), or if symptoms worsen or fail to improve, for If symptoms persist, Follow up of last visit, video visit.    Nika Marin MD  [facility] HERMELINDO Brown is a 59 year old, presenting for the following:  Musculoskeletal Problem (X3 weeks- Ache/pain in the tailbone area when sitting. Patient states that he has not fallen. No OTC medications for the pain. ) and Physical        12/17/2024     8:14 AM   Additional Questions   Roomed by  Graham MARTINEZ          History of Present Illness       Reason for visit:  J[place]   He is taking medications regularly.      Health Care Directive  Patient does not have a Health Care Directive: N/A         2022   Nutrition   Three or more servings of calcium each day? No   Diet: regular (no restrictions)    low fat/cholesterol       Multiple values from one day are sorted in reverse-chronological order         2022   Exercise   Frequency of exercise: 4-5 days/week            2024   Social Factors   Worry food won't last until get money to buy more No   Food not last or not have enough money for food? No   Do you have housing? (Housing is defined as stable permanent housing and does not include staying ouside in a car, in a tent, in an abandoned building, in an overnight shelter, or couch-surfing.) Yes   Are you worried about losing your housing? No   Lack of transportation? No   Unable to get utilities (heat,electricity)? No            2022   Dental   Dentist two times every year? Yes                 Today's PHQ-2 Score:       2024     6:52 AM   PHQ-2 (  [employer])   Q1: Little interest or pleasure in doing things 3    Q2: Feeling down, depressed or hopeless 0    PHQ-2 Score 3   Q1: Little interest or pleasure in doing things Nearly every day   Q2: Feeling down, depressed or hopeless Not at all   PHQ-2 Score 3       Patient-reported         2022   Substance Use   Alcohol more than 3/day or more than 7/wk No        Social History     Tobacco Use    Smoking status: Former     Current packs/day: 0.00     Average packs/day: 1 pack/day for 20.0 years (20.0 ttl pk-yrs)     Types: Cigarettes     Start date: 1984     Quit date: 2004     Years since quittin.3     Passive exposure: Past    Smokeless tobacco: Never   Vaping Use    Vaping status: Never Used   Substance Use Topics    Alcohol use: No     Alcohol/week: 0.0 standard drinks of alcohol    Drug use: No       Last PSA:   PSA    Date Value Ref Range Status   05/27/2021 0.98 0 - 4 ug/L Final     Comment:     Assay Method:  Chemiluminescence using Siemens Vista analyzer     Prostate Specific Antigen Screen   Date Value Ref Range Status   01/18/2024 0.90 0.00 - 4.50 ng/mL Final   09/06/2022 1.05 0.00 - 4.00 ug/L Final     ASCVD Risk   The 10-year ASCVD risk score (Yokasta MOBLEY, et al., 2019) is: 8.9%    Values used to calculate the score:      Age: 59 years      Sex: Male      Is Non- : No      Diabetic: No      Tobacco smoker: No      Systolic Blood Pressure: 139 mmHg      Is BP treated: No      HDL Cholesterol: 45 mg/dL      Total Cholesterol: 147 mg/dL      Reviewed and updated as needed this visit by Provider   Tobacco  Allergies  Meds  Problems  Med Hx  Surg Hx  Fam Hx            Past Medical History:   Diagnosis Date    Conductive hearing loss     Hearing problem     NO ACTIVE PROBLEMS     Tinnitus      Past Surgical History:   Procedure Laterality Date    COLONOSCOPY N/A 8/21/2024    Procedure: Colonoscopy with polypectomy using exacto snare;  Surgeon: Ellie Zafar MD;  Location: [facility]    HERNIORRHAPHY VENTRAL N/A 3/2/2015    Procedure: HERNIORRHAPHY VENTRAL;  Surgeon: Flavio Nowak MD;  Location: [facility] OR    RHINOPLASTY      [facility] NONSPECIFIC PROCEDURE      varicocoel     Lab work is in process  Labs reviewed in EPIC  BP Readings from Last 3 Encounters:   12/17/24 139/80   08/21/24 118/89   05/07/24 114/82    Wt Readings from Last 3 Encounters:   12/17/24 92.3 kg (203 lb 6.4 oz)   08/21/24 88.5 kg (195 lb)   05/07/24 88.6 kg (195 lb 4.8 oz)                  Patient Active Problem List   Diagnosis    CARDIOVASCULAR SCREENING; LDL GOAL LESS THAN 160    Seasonal allergic rhinitis    Ventral hernia    Arthritis of right knee    Baker's cyst of knee, right    Osteoma of external auditory canal    Tinnitus, bilateral     Past Surgical History:   Procedure Laterality Date    COLONOSCOPY N/A 8/21/2024     Procedure: Colonoscopy with polypectomy using exacto snare;  Surgeon: Ellie Zafar MD;  Location: [facility]    HERNIORRHAPHY VENTRAL N/A 3/2/2015    Procedure: HERNIORRHAPHY VENTRAL;  Surgeon: Flavio Nowak MD;  Location: [facility] OR    RHINOPLASTY      [facility] NONSPECIFIC PROCEDURE      varicocoel       Social History     Tobacco Use    Smoking status: Former     Current packs/day: 0.00     Average packs/day: 1 pack/day for 20.0 years (20.0 ttl pk-yrs)     Types: Cigarettes     Start date: 1984     Quit date: 2004     Years since quittin.3     Passive exposure: Past    Smokeless tobacco: Never   Substance Use Topics    Alcohol use: No     Alcohol/week: 0.0 standard drinks of alcohol     Family History   Problem Relation Age of Onset    C.A.D. Mother     Cerebrovascular Disease Mother     Cancer Father         lung CA    Cancer - colorectal Father     Breast Cancer Sister     Diabetes No family hx of     Hypertension No family hx of     Colon Cancer No family hx of          Current Outpatient Medications   Medication Sig Dispense Refill    atorvastatin (LIPITOR) 20 MG tablet Take 1 tablet (20 mg) by mouth daily. 90 tablet 3    meloxicam (MOBIC) 15 MG tablet Take 1 tablet (15 mg) by mouth daily as needed for moderate pain or pain. 30 tablet 1    tiZANidine (ZANAFLEX) 4 MG tablet Take 1 tablet (4 mg) by mouth nightly as needed for muscle spasms. 30 tablet 1     No Known Allergies  Recent Labs   Lab Test 24  0729 22  0737 21  0729 20  1020 20  0843   A1C 6.1*  --   --  5.4  --    LDL 88 70 96  --  200*   HDL 45 48 55  --  46   TRIG 71 51 55  --  131   ALT 13 16 17  --  25   CR 1.07 0.98 0.91  --  0.84   GFRESTIMATED 79 89 >90  --  >90   GFRESTBLACK  --   --  >90  --  >90   POTASSIUM 4.4 4.2 3.9  --  4.5          Review of Systems  Constitutional, HEENT, cardiovascular, pulmonary, GI, , musculoskeletal, neuro, skin, endocrine and psych systems are negative, except as otherwise  "noted.     Objective    Exam  /80   Pulse 76   Temp 97  F (36.1  C) (Tympanic)   Resp 14   Ht 1.72 m (5' 7.72\")   Wt 92.3 kg (203 lb 6.4 oz)   SpO2 98%   BMI 31.19 kg/m     Estimated body mass index is 31.19 kg/m  as calculated from the following:    Height as of this encounter: 1.72 m (5' 7.72\").    Weight as of this encounter: 92.3 kg (203 lb 6.4 oz).    Physical Exam  GENERAL: alert and no distress  EYES: Eyes grossly normal to inspection, PERRL and conjunctivae and sclerae normal  HENT: ear canals and TM's normal, nose and mouth without ulcers or lesions  NECK: no adenopathy, no asymmetry, masses, or scars  Positive for mild Occipitalis and trapezius spasm bilaterally.   RESP: lungs clear to auscultation - no rales, rhonchi or wheezes  CV: regular rate and rhythm, normal S1 S2, no S3 or S4, no murmur, click or rub, no peripheral edema  ABDOMEN: soft, nontender, no hepatosplenomegaly, no masses and bowel sounds normal  MS: no gross musculoskeletal defects noted, no edema  TAILBONE EXAM : Positive for tenderness on palpation of tip of Coccyx bone, no other anal concerns.  SKIN: no suspicious lesions or rashes  NEURO: Normal strength and tone, mentation intact and speech normal  PSYCH: mentation appears normal, affect normal/bright        Signed Electronically by: Nika Marin MD    "

## 2024-12-17 NOTE — PATIENT INSTRUCTIONS
As discussed, please do fasting labs placed    Will consider Coccyx X ray and do further recommendations.     Symptomatic medications sent for your Coccyx pain , as well as neck muscle spasm with drowsiness as side effect.     Refills will be taken care on cholesterol medication .     ===============================================  Patient Education   Preventive Care Advice   This is general advice given by our system to help you stay healthy. However, your care team may have specific advice just for you. Please talk to your care team about your preventive care needs.  Nutrition  Eat 5 or more servings of fruits and vegetables each day.  Try wheat bread, brown rice and whole grain pasta (instead of white bread, rice, and pasta).  Get enough calcium and vitamin D. Check the label on foods and aim for 100% of the RDA (recommended daily allowance).  Lifestyle  Exercise at least 150 minutes each week  (30 minutes a day, 5 days a week).  Do muscle strengthening activities 2 days a week. These help control your weight and prevent disease.  No smoking.  Wear sunscreen to prevent skin cancer.  Have a dental exam and cleaning every 6 months.  Yearly exams  See your health care team every year to talk about:  Any changes in your health.  Any medicines your care team has prescribed.  Preventive care, family planning, and ways to prevent chronic diseases.  Shots (vaccines)   HPV shots (up to age 26), if you've never had them before.  Hepatitis B shots (up to age 59), if you've never had them before.  COVID-19 shot: Get this shot when it's due.  Flu shot: Get a flu shot every year.  Tetanus shot: Get a tetanus shot every 10 years.  Pneumococcal, hepatitis A, and RSV shots: Ask your care team if you need these based on your risk.  Shingles shot (for age 50 and up)  General health tests  Diabetes screening:  Starting at age 35, Get screened for diabetes at least every 3 years.  If you are younger than age 35, ask your care team  if you should be screened for diabetes.  Cholesterol test: At age 39, start having a cholesterol test every 5 years, or more often if advised.  Bone density scan (DEXA): At age 50, ask your care team if you should have this scan for osteoporosis (brittle bones).  Hepatitis C: Get tested at least once in your life.  STIs (sexually transmitted infections)  Before age 24: Ask your care team if you should be screened for STIs.  After age 24: Get screened for STIs if you're at risk. You are at risk for STIs (including HIV) if:  You are sexually active with more than one person.  You don't use condoms every time.  You or a partner was diagnosed with a sexually transmitted infection.  If you are at risk for HIV, ask about PrEP medicine to prevent HIV.  Get tested for HIV at least once in your life, whether you are at risk for HIV or not.  Cancer screening tests  Cervical cancer screening: If you have a cervix, begin getting regular cervical cancer screening tests starting at age 21.  Breast cancer scan (mammogram): If you've ever had breasts, begin having regular mammograms starting at age 40. This is a scan to check for breast cancer.  Colon cancer screening: It is important to start screening for colon cancer at age 45.  Have a colonoscopy test every 10 years (or more often if you're at risk) Or, ask your provider about stool tests like a FIT test every year or Cologuard test every 3 years.  To learn more about your testing options, visit:   .  For help making a decision, visit:   https://bit.ly/my92181.  Prostate cancer screening test: If you have a prostate, ask your care team if a prostate cancer screening test (PSA) at age 55 is right for you.  Lung cancer screening: If you are a current or former smoker ages 50 to 80, ask your care team if ongoing lung cancer screenings are right for you.  For informational purposes only. Not to replace the advice of your health care provider. Copyright   2023 [facility]  Services. All rights reserved. Clinically reviewed by the [facility] Transitions Program. [employer] [id number] - REV 01/24.

## 2024-12-17 NOTE — PROGRESS NOTES
"  {PROVIDER CHARTING PREFERENCE:936213}    Carol Brown is a 59 year old, presenting for the following health issues:  Musculoskeletal Problem (X3 weeks- Ache/pain in the tailbone area when sitting. Patient states that he has not fallen. No OTC medications for the pain. )        12/17/2024     8:14 AM   Additional Questions   Roomed by Graham MARTINEZ     Musculoskeletal Problem    History of Present Illness       Reason for visit:  Tailbone   He is taking medications regularly.       {MA/LPN/RN Pre-Provider Visit Orders- hCG/UA/Strep (Optional):777308}  Patient has been feeling an achy pain in tailbone area 3weeks. Patient states that he hasn't fallen. Has not taken any OTC medications to resolve discomfort/pain.  {additonal problems for provider to add (Optional):810887}    {ROS Picklists (Optional):556902}      Objective    BP (!) 140/80   Pulse 76   Temp 97  F (36.1  C) (Tympanic)   Resp 14   Ht 1.72 m (5' 7.72\")   Wt 92.3 kg (203 lb 6.4 oz)   SpO2 98%   BMI 31.19 kg/m    Body mass index is 31.19 kg/m .  Physical Exam   {Exam List (Optional):819536}    {Diagnostic Test Results (Optional):012191}        Signed Electronically by: Nika Marin MD  {Email feedback regarding this note to [email or web address]   :[id number]}  "

## 2024-12-17 NOTE — PROGRESS NOTES
"  Carol Brown is a 59 year old, presenting for the following health issues:  Musculoskeletal Problem (X3 weeks- Ache/pain in the tailbone area when sitting. Patient states that he has not fallen. No OTC medications for the pain. ) and Physical      12/17/2024     8:14 AM   Additional Questions   Roomed by Graham MARTINEZ     Musculoskeletal Problem    Healthy Habits:     Taking medications regularly:  0  History of Present Illness       Reason for visit:  Tailbone   He is taking medications regularly.                     Objective    /80   Pulse 76   Temp 97  F (36.1  C) (Tympanic)   Resp 14   Ht 1.72 m (5' 7.72\")   Wt 92.3 kg (203 lb 6.4 oz)   SpO2 98%   BMI 31.19 kg/m    Body mass index is 31.19 kg/m .  Physical Exam               Signed Electronically by: Nika Marin MD    "

## 2024-12-18 ENCOUNTER — ANCILLARY PROCEDURE (OUTPATIENT)
Dept: GENERAL RADIOLOGY | Facility: CLINIC | Age: 59
End: 2024-12-18
Attending: INTERNAL MEDICINE
Payer: COMMERCIAL

## 2024-12-18 DIAGNOSIS — M53.3 COCCYDYNIA: ICD-10-CM

## 2024-12-18 PROCEDURE — 72220 X-RAY EXAM SACRUM TAILBONE: CPT | Mod: TC | Performed by: RADIOLOGY

## 2025-01-20 ENCOUNTER — THERAPY VISIT (OUTPATIENT)
Dept: PHYSICAL THERAPY | Facility: CLINIC | Age: 60
End: 2025-01-20
Attending: PHYSICIAN ASSISTANT
Payer: COMMERCIAL

## 2025-01-20 DIAGNOSIS — M54.2 NECK PAIN: ICD-10-CM

## 2025-01-20 DIAGNOSIS — G44.209 TENSION HEADACHE: ICD-10-CM

## 2025-01-20 DIAGNOSIS — M54.2 CERVICAL PAIN: Primary | ICD-10-CM

## 2025-01-20 PROCEDURE — 97110 THERAPEUTIC EXERCISES: CPT | Mod: GP | Performed by: PHYSICAL THERAPIST

## 2025-01-20 PROCEDURE — 97161 PT EVAL LOW COMPLEX 20 MIN: CPT | Mod: GP | Performed by: PHYSICAL THERAPIST

## 2025-01-20 PROCEDURE — 97530 THERAPEUTIC ACTIVITIES: CPT | Mod: GP | Performed by: PHYSICAL THERAPIST

## 2025-01-20 NOTE — PROGRESS NOTES
"PHYSICAL THERAPY EVALUATION  Type of Visit: Evaluation       Fall Risk Screen:  Fall screen completed by: PT  Have you fallen 2 or more times in the past year?: No  Have you fallen and had an injury in the past year?: No  Is patient a fall risk?: No    Subjective         Presenting condition or subjective complaint: Head aches  Date of onset: 01/17/25 (MD order date)    Relevant medical history:     Dates & types of surgery: Traction    Prior diagnostic imaging/testing results:     xray lumbar   Prior therapy history for the same diagnosis, illness or injury: Yes      Prior Level of Function  Transfers: Independent  Ambulation: Independent  ADL: Independent      Living Environment  Social support: With a significant other or spouse   Type of home: House   Stairs to enter the home: Yes 14 Is there a railing: Yes     Ramp: No   Stairs inside the home: Yes 14 Is there a railing: Yes     Help at home: None  Equipment owned:       Employment: No  Retired -  30 years  Hobbies/Interests:      Patient goals for therapy: Have no headache    Patient has history of chronic intermittent neck pain associated with lack of range of motion, has had treatment in the past.  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  No pain at the time, but did start shortly after.  Pain progressed to a headache in addition to neck pain.  At this time pain is constant bilateral cervical ranges 5-8/10, describes as \"achy\" with intermittent bilateral temporal headaches.  Symptoms increase with turning the head, extension/flexion and sidebending, looking down/reading intermittently.  Symptoms may have decreased last week with chiropractic visit, medication.  He has a home Moore traction and has used a few times, may have helped.       Objective     Posture  Fair.  Forward head, rounded shoulders.     Cervical ROM  Flexion: " WNL  Extension:  50% - pain and stiffness  Left rotation:  66% - pain and stiffness  Right rotation:  50% - pain and stiffness  Bilateral sidebend 33% with pain     Cervical dermatomes   WNL/symetrical to light touch    Cervical myotomes    WNL/symetrical MMT elbows and wrists    Reflexes  Not assessed     Repeated movement testing  Pretest symptoms sitting:  pain cervical and minimal temporal HA   Correction of sitting posture:  decrease cervical pain   Repeated retraction:  with pt overpressure, increase pain/NW, NE ROM         Assessment & Plan   CLINICAL IMPRESSIONS  Medical Diagnosis: Neck pain    Treatment Diagnosis: Neck pain and headaches   Impression/Assessment: Patient is a 59 year old male with neck pain and headache complaints.  The following significant findings have been identified: Pain, Decreased ROM/flexibility, Inflammation, Decreased activity tolerance, and Impaired posture. These impairments interfere with their ability to perform self care tasks, recreational activities, and household chores as compared to previous level of function.     Clinical Decision Making (Complexity):  Clinical Presentation: Stable/Uncomplicated  Clinical Presentation Rationale: based on medical and personal factors listed in PT evaluation  Clinical Decision Making (Complexity): Low complexity    PLAN OF CARE  Treatment Interventions:  Interventions: Neuromuscular Re-education, Therapeutic Activity, Therapeutic Exercise, Self-Care/Home Management    Long Term Goals     PT Goal 1  Goal Identifier: Driving  Goal Description: Patient will have 75% or greater bilateral rotation with 2/10 pain or less  Rationale: to maximize safety and independence with performance of ADLs and functional tasks;to maximize safety and independence within the home;to maximize safety and independence within the community;to maximize safety and independence with self cares (and driving)  Target Date: 03/17/25  PT Goal 2  Goal Identifier:  Headaches  Goal Description: 1 headache or less per week, 2/10 or less  Rationale:  (for concentration and ADLs)  Target Date: 03/17/25      Frequency of Treatment: 1x/week for 4 weeks then 2x/month for 2 visits  Duration of Treatment: 8 weeks      Education Assessment:        Risks and benefits of evaluation/treatment have been explained.   Patient/Family/caregiver agrees with Plan of Care.     Evaluation Time:     PT Neville Low Complexity Minutes (61078): 25       Signing Clinician: Brielle Yeager PT

## 2025-01-27 ENCOUNTER — THERAPY VISIT (OUTPATIENT)
Dept: PHYSICAL THERAPY | Facility: CLINIC | Age: 60
End: 2025-01-27
Payer: COMMERCIAL

## 2025-01-27 DIAGNOSIS — G44.209 TENSION HEADACHE: ICD-10-CM

## 2025-01-27 DIAGNOSIS — M54.2 CERVICAL PAIN: Primary | ICD-10-CM

## 2025-01-27 PROCEDURE — 97530 THERAPEUTIC ACTIVITIES: CPT | Mod: GP | Performed by: PHYSICAL THERAPIST

## 2025-01-27 PROCEDURE — 97110 THERAPEUTIC EXERCISES: CPT | Mod: GP | Performed by: PHYSICAL THERAPIST

## (undated) DEVICE — ENDO SNARE EXACTO COLD 9MM LOOP 2.4MMX230CM 00711115

## (undated) DEVICE — ENDO TRAP POLYP QUICK CATCH 710201

## (undated) RX ORDER — FENTANYL CITRATE 50 UG/ML
INJECTION, SOLUTION INTRAMUSCULAR; INTRAVENOUS
Status: DISPENSED
Start: 2024-08-21